# Patient Record
Sex: MALE | Race: WHITE | HISPANIC OR LATINO | Employment: PART TIME | URBAN - METROPOLITAN AREA
[De-identification: names, ages, dates, MRNs, and addresses within clinical notes are randomized per-mention and may not be internally consistent; named-entity substitution may affect disease eponyms.]

---

## 2020-11-03 ENCOUNTER — OFFICE VISIT (OUTPATIENT)
Dept: URGENT CARE | Facility: CLINIC | Age: 30
End: 2020-11-03
Payer: COMMERCIAL

## 2020-11-03 VITALS
HEIGHT: 67 IN | HEART RATE: 78 BPM | WEIGHT: 230 LBS | BODY MASS INDEX: 36.1 KG/M2 | RESPIRATION RATE: 18 BRPM | OXYGEN SATURATION: 98 % | TEMPERATURE: 98.6 F

## 2020-11-03 DIAGNOSIS — R05.9 COUGH: ICD-10-CM

## 2020-11-03 DIAGNOSIS — B34.9 VIRAL SYNDROME: ICD-10-CM

## 2020-11-03 DIAGNOSIS — R05.9 COUGH: Primary | ICD-10-CM

## 2020-11-03 PROCEDURE — 99213 OFFICE O/P EST LOW 20 MIN: CPT | Performed by: NURSE PRACTITIONER

## 2020-11-03 PROCEDURE — U0003 INFECTIOUS AGENT DETECTION BY NUCLEIC ACID (DNA OR RNA); SEVERE ACUTE RESPIRATORY SYNDROME CORONAVIRUS 2 (SARS-COV-2) (CORONAVIRUS DISEASE [COVID-19]), AMPLIFIED PROBE TECHNIQUE, MAKING USE OF HIGH THROUGHPUT TECHNOLOGIES AS DESCRIBED BY CMS-2020-01-R: HCPCS | Performed by: NURSE PRACTITIONER

## 2020-11-05 LAB — SARS-COV-2 RNA SPEC QL NAA+PROBE: NOT DETECTED

## 2020-11-08 ENCOUNTER — DOCUMENTATION (OUTPATIENT)
Dept: URGENT CARE | Facility: CLINIC | Age: 30
End: 2020-11-08

## 2020-11-10 ENCOUNTER — OFFICE VISIT (OUTPATIENT)
Dept: FAMILY MEDICINE CLINIC | Facility: CLINIC | Age: 30
End: 2020-11-10
Payer: COMMERCIAL

## 2020-11-10 VITALS
HEART RATE: 58 BPM | BODY MASS INDEX: 37.26 KG/M2 | TEMPERATURE: 98 F | WEIGHT: 237.4 LBS | RESPIRATION RATE: 18 BRPM | OXYGEN SATURATION: 98 % | HEIGHT: 67 IN | DIASTOLIC BLOOD PRESSURE: 74 MMHG | SYSTOLIC BLOOD PRESSURE: 124 MMHG

## 2020-11-10 DIAGNOSIS — Z13.220 SCREENING, LIPID: ICD-10-CM

## 2020-11-10 DIAGNOSIS — Z13.1 SCREENING FOR DIABETES MELLITUS: ICD-10-CM

## 2020-11-10 DIAGNOSIS — Z11.4 SCREENING FOR HIV (HUMAN IMMUNODEFICIENCY VIRUS): ICD-10-CM

## 2020-11-10 DIAGNOSIS — Z00.00 HEALTHCARE MAINTENANCE: Primary | ICD-10-CM

## 2020-11-10 PROCEDURE — 3008F BODY MASS INDEX DOCD: CPT | Performed by: FAMILY MEDICINE

## 2020-11-10 PROCEDURE — 99385 PREV VISIT NEW AGE 18-39: CPT | Performed by: FAMILY MEDICINE

## 2020-11-10 PROCEDURE — 3725F SCREEN DEPRESSION PERFORMED: CPT | Performed by: FAMILY MEDICINE

## 2020-11-10 PROCEDURE — 1036F TOBACCO NON-USER: CPT | Performed by: FAMILY MEDICINE

## 2020-12-18 ENCOUNTER — HOSPITAL ENCOUNTER (EMERGENCY)
Facility: HOSPITAL | Age: 30
Discharge: HOME | End: 2020-12-18
Attending: EMERGENCY MEDICINE
Payer: COMMERCIAL

## 2020-12-18 VITALS
BODY MASS INDEX: 35.31 KG/M2 | DIASTOLIC BLOOD PRESSURE: 76 MMHG | SYSTOLIC BLOOD PRESSURE: 136 MMHG | HEIGHT: 67 IN | OXYGEN SATURATION: 96 % | RESPIRATION RATE: 18 BRPM | TEMPERATURE: 98 F | WEIGHT: 225 LBS | HEART RATE: 73 BPM

## 2020-12-18 DIAGNOSIS — M54.50 ACUTE LOW BACK PAIN: Primary | ICD-10-CM

## 2020-12-18 PROCEDURE — 96372 THER/PROPH/DIAG INJ SC/IM: CPT

## 2020-12-18 PROCEDURE — 99284 EMERGENCY DEPT VISIT MOD MDM: CPT | Performed by: EMERGENCY MEDICINE

## 2020-12-18 PROCEDURE — 99283 EMERGENCY DEPT VISIT LOW MDM: CPT

## 2020-12-18 RX ORDER — LIDOCAINE 50 MG/G
1 PATCH TOPICAL ONCE
Status: DISCONTINUED | OUTPATIENT
Start: 2020-12-18 | End: 2020-12-19 | Stop reason: HOSPADM

## 2020-12-18 RX ORDER — METHOCARBAMOL 750 MG/1
750 TABLET, FILM COATED ORAL EVERY 6 HOURS PRN
Qty: 20 TABLET | Refills: 0 | Status: SHIPPED | OUTPATIENT
Start: 2020-12-18 | End: 2021-04-05

## 2020-12-18 RX ORDER — KETOROLAC TROMETHAMINE 30 MG/ML
30 INJECTION, SOLUTION INTRAMUSCULAR; INTRAVENOUS ONCE
Status: COMPLETED | OUTPATIENT
Start: 2020-12-18 | End: 2020-12-18

## 2020-12-18 RX ORDER — METHOCARBAMOL 500 MG/1
1500 TABLET, FILM COATED ORAL ONCE
Status: COMPLETED | OUTPATIENT
Start: 2020-12-18 | End: 2020-12-18

## 2020-12-18 RX ORDER — NAPROXEN 500 MG/1
500 TABLET ORAL 2 TIMES DAILY WITH MEALS
Qty: 20 TABLET | Refills: 0 | Status: SHIPPED | OUTPATIENT
Start: 2020-12-18 | End: 2021-04-05

## 2020-12-18 RX ADMIN — LIDOCAINE 5% 1 PATCH: 700 PATCH TOPICAL at 21:58

## 2020-12-18 RX ADMIN — KETOROLAC TROMETHAMINE 30 MG: 30 INJECTION, SOLUTION INTRAMUSCULAR at 21:57

## 2020-12-18 RX ADMIN — METHOCARBAMOL TABLETS 1500 MG: 500 TABLET, COATED ORAL at 21:58

## 2021-03-25 ENCOUNTER — OFFICE VISIT (OUTPATIENT)
Dept: URGENT CARE | Facility: CLINIC | Age: 31
End: 2021-03-25
Payer: COMMERCIAL

## 2021-03-25 VITALS
DIASTOLIC BLOOD PRESSURE: 80 MMHG | RESPIRATION RATE: 16 BRPM | BODY MASS INDEX: 35.29 KG/M2 | WEIGHT: 222 LBS | OXYGEN SATURATION: 99 % | SYSTOLIC BLOOD PRESSURE: 110 MMHG | HEART RATE: 53 BPM | TEMPERATURE: 98.3 F

## 2021-03-25 DIAGNOSIS — H57.89 IRRITATION OF RIGHT EYE: Primary | ICD-10-CM

## 2021-03-25 PROCEDURE — 99213 OFFICE O/P EST LOW 20 MIN: CPT | Performed by: PHYSICIAN ASSISTANT

## 2021-03-25 RX ORDER — IBUPROFEN 600 MG/1
TABLET ORAL
COMMUNITY
Start: 2021-02-23 | End: 2021-04-05

## 2021-03-25 NOTE — PROGRESS NOTES
[facility]        NAME: Hung Quezada is a 35 y o  male  : 1990    MRN: [id number]  DATE: 2021  TIME: 8:52 AM    Assessment and Plan   Irritation of right eye [H57 89]  1  Irritation of right eye           Patient Instructions   Patient Instructions   Use your lubricating eye drops today   Follow with your eye doctor if not improving   Watch out for a rash forming by your eye  If this happens be seen again         Follow up with PCP in 3-5 days  Proceed to  ER if symptoms worsen  Chief Complaint     Chief Complaint   Patient presents with    Eye Pain     R eye burning and eye lid swelling since 5 am         History of Present Illness       The pt is a 81-year-old male presenting with burning of his right eye and lid swelling since 5 am this morning  Denies blurry vision, pain with eye movements, fever or chills  He did put lotion on last night and is not sure if he got it into his eye during the night  He does admit to some watery drainage  The pain and swelling has gone down  Review of Systems   Review of Systems   Constitutional: Negative for activity change, appetite change and fever  Eyes: Positive for pain and redness (Mild )  Negative for discharge (Watery drainage ) and visual disturbance  Contact wearer          Current Medications       Current Outpatient Medications:     ibuprofen (MOTRIN) 600 mg tablet, take 1 tablet by mouth four times a day before meals and at bedti   (REFER TO PRESCRIPTION NOTES)  , Disp: , Rfl:     methocarbamol (ROBAXIN) 750 mg tablet, Take 1 tablet (750 mg total) by mouth every 6 (six) hours as needed for muscle spasms (Patient not taking: Reported on 3/25/2021), Disp: 20 tablet, Rfl: 0    naproxen (NAPROSYN) 500 mg tablet, Take 1 tablet (500 mg total) by mouth 2 (two) times a day with meals (Patient not taking: Reported on 3/25/2021), Disp: 20 tablet, Rfl: 0    Current Allergies     Allergies as of 2021 - Reviewed 03/25/2021   Allergen Reaction Noted    Penicillins  11/03/2020            The following portions of the patient's history were reviewed and updated as appropriate: allergies, current medications, past family history, past medical history, past social history, past surgical history and problem list      Past Medical History:   Diagnosis Date    No pertinent past medical history        Past Surgical History:   Procedure Laterality Date    NO PAST SURGERIES         Family History   Problem Relation Age of Onset    No Known Problems Mother     Hypertension Father     Diabetes Maternal Grandmother     Diabetes Paternal Grandmother          Medications have been verified  Objective   /80   Pulse (!) 53   Temp 98 3 °F (36 8 °C) (Temporal)   Resp 16   Wt 101 kg (222 lb)   SpO2 99%   BMI 35 29 kg/m²          Physical Exam     Physical Exam  Vitals signs reviewed  Constitutional:       General: He is not in acute distress  Appearance: Normal appearance  He is normal weight  He is not ill-appearing, toxic-appearing or diaphoretic  HENT:      Head: Normocephalic and atraumatic  Eyes:      General: No visual field deficit  Right eye: No discharge or hordeolum  Left eye: No discharge or hordeolum  Extraocular Movements: Extraocular movements intact  Right eye: Normal extraocular motion  Left eye: Normal extraocular motion  Conjunctiva/sclera:      Right eye: Right conjunctiva is injected  No chemosis, exudate or hemorrhage  Left eye: Left conjunctiva is not injected  No chemosis, exudate or hemorrhage  Neck:      Musculoskeletal: Normal range of motion  Cardiovascular:      Rate and Rhythm: Normal rate and regular rhythm  Heart sounds: Normal heart sounds  No murmur  No friction rub  No gallop  Pulmonary:      Effort: Pulmonary effort is normal  No respiratory distress  Breath sounds: Normal breath sounds  No stridor   No wheezing, rhonchi or rales  Chest:      Chest wall: No tenderness  Lymphadenopathy:      Cervical: No cervical adenopathy  Skin:     General: Skin is warm and dry  Capillary Refill: Capillary refill takes less than 2 seconds  Neurological:      Mental Status: He is alert  Universal Protocol:  Consent: Verbal consent obtained  Consent given by: patient  Patient understanding: patient states understanding of the procedure being performed  Patient identity confirmed: verbally with patient    Foreign body removal    Date/Time: 3/25/2021 8:51 AM  Performed by: Lazarus Hoot, PA-C  Authorized by: Lazarus Hoot, PA-C   Body area: eye  Location details: right conjunctiva    Anesthesia:  Local Anesthetic: tetracaine drops  Anesthetic total (drops): 1  Sedation:  Patient sedated: no  Comments: Eye stained and visualized with wood lamp   No uptake seen

## 2021-03-25 NOTE — PATIENT INSTRUCTIONS
Use your lubricating eye drops today   Follow with your eye doctor if not improving   Watch out for a rash forming by your eye   If this happens be seen again

## 2021-04-04 ENCOUNTER — HOSPITAL ENCOUNTER (EMERGENCY)
Facility: HOSPITAL | Age: 31
Discharge: HOME | End: 2021-04-04
Attending: EMERGENCY MEDICINE
Payer: COMMERCIAL

## 2021-04-04 VITALS
OXYGEN SATURATION: 96 % | SYSTOLIC BLOOD PRESSURE: 125 MMHG | DIASTOLIC BLOOD PRESSURE: 73 MMHG | HEART RATE: 94 BPM | TEMPERATURE: 100.8 F | RESPIRATION RATE: 18 BRPM

## 2021-04-04 DIAGNOSIS — Z20.822 SUSPECTED COVID-19 VIRUS INFECTION: Primary | ICD-10-CM

## 2021-04-04 PROCEDURE — 99283 EMERGENCY DEPT VISIT LOW MDM: CPT

## 2021-04-04 PROCEDURE — 87635 SARS-COV-2 COVID-19 AMP PRB: CPT | Performed by: NURSE PRACTITIONER

## 2021-04-04 PROCEDURE — 99284 EMERGENCY DEPT VISIT MOD MDM: CPT | Performed by: NURSE PRACTITIONER

## 2021-04-04 NOTE — ED PROVIDER NOTES
History  Chief Complaint   Patient presents with    Flu Symptoms     Patient co fever and chills that started this morning  Patient reports sister recently tested + for covid  This is a 25-year-old male patient presents here with his girlfriend with complaints concerns for COVID infection  He reports he fell ill with symptoms yesterday  Reports his sister who was [place] tested positive a few days ago  He is endorsing fever today  Body aches chills  Flu Symptoms  Presenting symptoms: fever    Presenting symptoms: no cough, no shortness of breath, no sore throat and no vomiting    Associated symptoms: chills    Associated symptoms: no ear pain        Prior to Admission Medications   Prescriptions Last Dose Informant Patient Reported? Taking?   ibuprofen (MOTRIN) 600 mg tablet   Yes No   Sig: take 1 tablet by mouth four times a day before meals and at bedti   (REFER TO PRESCRIPTION NOTES)  methocarbamol (ROBAXIN) 750 mg tablet   No No   Sig: Take 1 tablet (750 mg total) by mouth every 6 (six) hours as needed for muscle spasms   Patient not taking: Reported on 3/25/2021   naproxen (NAPROSYN) 500 mg tablet   No No   Sig: Take 1 tablet (500 mg total) by mouth 2 (two) times a day with meals   Patient not taking: Reported on 3/25/2021      Facility-Administered Medications: None       Past Medical History:   Diagnosis Date    No pertinent past medical history        Past Surgical History:   Procedure Laterality Date    NO PAST SURGERIES         Family History   Problem Relation Age of Onset    No Known Problems Mother     Hypertension Father     Diabetes Maternal Grandmother     Diabetes Paternal Grandmother      I have reviewed and agree with the history as documented      E-Cigarette/Vaping    E-Cigarette Use Never User      E-Cigarette/Vaping Substances     Social History     Tobacco Use    Smoking status: Former Smoker    Smokeless tobacco: Never Used    Tobacco comment: Quit 11/2018; 1/2 ppd for 10 years    Substance Use Topics    Alcohol use: Yes     Frequency: 2-3 times a week    Drug use: Never       Review of Systems   Constitutional: Positive for chills and fever  HENT: Negative for ear pain and sore throat  Eyes: Negative for pain and visual disturbance  Respiratory: Negative for cough and shortness of breath  Cardiovascular: Negative for chest pain and palpitations  Gastrointestinal: Negative for abdominal pain and vomiting  Genitourinary: Negative for dysuria and hematuria  Musculoskeletal: Positive for arthralgias  Negative for back pain  Skin: Negative for color change and rash  Neurological: Negative for seizures and syncope  All other systems reviewed and are negative  Physical Exam  Physical Exam  Vitals signs and nursing note reviewed  Constitutional:       General: He is not in acute distress  Appearance: He is well-developed  HENT:      Head: Normocephalic and atraumatic  Eyes:      General:         Right eye: No discharge  Left eye: No discharge  Conjunctiva/sclera: Conjunctivae normal    Neck:      Musculoskeletal: Normal range of motion and neck supple  Cardiovascular:      Rate and Rhythm: Normal rate  Pulmonary:      Effort: Pulmonary effort is normal  No respiratory distress  Abdominal:      General: There is no distension  Tenderness: There is no guarding  Musculoskeletal:         General: No deformity  Skin:     General: Skin is warm and dry  Neurological:      Mental Status: He is alert and oriented to person, place, and time        Coordination: Coordination normal          Vital Signs  ED Triage Vitals [04/04/21 1816]   Temperature Pulse Respirations Blood Pressure SpO2   (!) 100 8 °F (38 2 °C) 94 18 125/73 96 %      Temp Source Heart Rate Source Patient Position - Orthostatic VS BP Location FiO2 (%)   Oral Monitor Sitting Left arm --      Pain Score       --           Vitals:    04/04/21 1816 BP: 125/73   Pulse: 94   Patient Position - Orthostatic VS: Sitting         Visual Acuity      ED Medications  Medications - No data to display    Diagnostic Studies  Results Reviewed     Procedure Component Value Units Date/Time    [id number] Collected: 04/04/21 1909    Lab Status: In process Specimen: Nares from Nasopharyngeal Swab Updated: 04/04/21 1911                 No orders to display              Procedures  Procedures         ED Course                                           MDM  Number of Diagnoses or Management Options  Suspected COVID-19 virus infection: new and requires workup  Diagnosis management comments: Patient notified of the 12-24 hour delay for test results  Instructed him to check his MyChart apt to get results in real time  He was agreeable to this  Amount and/or Complexity of Data Reviewed  Clinical lab tests: ordered  Independent visualization of images, tracings, or specimens: yes    Patient Progress  Patient progress: stable      Disposition  Final diagnoses:   Suspected COVID-19 virus infection     Time reflects when diagnosis was documented in both MDM as applicable and the Disposition within this note     Time User Action Codes Description Comment    4/4/2021  6:41 PM Pam Henry Add [Z20 822] Suspected COVID-19 virus infection       ED Disposition     ED Disposition Condition Date/Time Comment    Discharge Stable Sun Apr 4, 2021  6:41 PM Jeff Edwards discharge to home/self care              Follow-up Information     Follow up With Specialties Details Why Contact Olamide Talamantes, DO Family Medicine  As needed   [address]  [facility]  [phone]            Discharge Medication List as of 4/4/2021  6:41 PM      CONTINUE these medications which have NOT CHANGED    Details   ibuprofen (MOTRIN) 600 mg tablet take 1 tablet by mouth four times a day before meals and at bedti   (REFER TO PRESCRIPTION NOTES)  , Historical Med      methocarbamol (ROBAXIN) 750 mg tablet Take 1 tablet (750 mg total) by mouth every 6 (six) hours as needed for muscle spasms, Starting Fri 12/18/2020, Print      naproxen (NAPROSYN) 500 mg tablet Take 1 tablet (500 mg total) by mouth 2 (two) times a day with meals, Starting Fri 12/18/2020, Print           No discharge procedures on file      PDMP Review     None          ED Provider  Electronically Signed by           LAVERNE Agustin  04/04/21 3088

## 2021-04-04 NOTE — Clinical Note
Jessenia Whittington was seen and treated in our emergency department on 4/4/2021  Diagnosis:     Slick Ocasio  may return to work on return date  He may return on this date: 04/13/2021         If you have any questions or concerns, please don't hesitate to call        Shagufta Davis RN    ______________________________           _______________          _______________  Hospital Representative                              Date                                Time

## 2021-04-05 ENCOUNTER — TELEMEDICINE (OUTPATIENT)
Dept: FAMILY MEDICINE CLINIC | Facility: CLINIC | Age: 31
End: 2021-04-05
Payer: COMMERCIAL

## 2021-04-05 DIAGNOSIS — U07.1 COVID-19: Primary | ICD-10-CM

## 2021-04-05 LAB — SARS-COV-2 RNA RESP QL NAA+PROBE: POSITIVE

## 2021-04-05 PROCEDURE — 99214 OFFICE O/P EST MOD 30 MIN: CPT | Performed by: FAMILY MEDICINE

## 2021-04-05 RX ORDER — BENZONATATE 100 MG/1
100 CAPSULE ORAL 3 TIMES DAILY PRN
Qty: 20 CAPSULE | Refills: 0 | Status: SHIPPED | OUTPATIENT
Start: 2021-04-05 | End: 2021-11-11 | Stop reason: ALTCHOICE

## 2021-04-05 NOTE — PROGRESS NOTES
COVID-19 Outpatient Progress Note    Assessment/Plan:    Problem List Items Addressed This Visit        Other    COVID-19 - Primary    Relevant Medications    benzonatate (TESSALON PERLES) 100 mg capsule         Disposition:     I recommended continued isolation until at least 24 hours have passed since recovery defined as resolution of fever without the use of fever-reducing medications AND improvement in COVID symptoms AND 10 days have passed since onset of symptoms (or 10 days have passed since date of first positive viral diagnostic test for asymptomatic patients)  Encouraged supportive care and to call if symptoms worsen  Reviewed mAb -- pt would like to think about  I have spent 8 minutes directly with the patient  Encounter provider Sasha Almaguer DO    Provider located at [place] [address]  [address] [place] [phone]    Recent Visits  No visits were found meeting these conditions  Showing recent visits within past 7 days and meeting all other requirements     Today's Visits  Date Type Provider Dept   04/05/21 Telemedicine Lani Boyd DO [facility] [address] today's visits and meeting all other requirements     Future Appointments  No visits were found meeting these conditions  Showing future appointments within next 150 days and meeting all other requirements        Patient agrees to participate in a virtual check in via telephone or video visit instead of presenting to the office to address urgent/immediate medical needs  Patient is aware this is a billable service  After connecting through Telephone, the patient was identified by name and date of birth  Veronica Barclay was informed that this was a telemedicine visit and that the exam was being conducted confidentially over secure lines  My office door was closed  No one else was in the room   Veronica Barclay acknowledged consent and understanding of privacy and security of the telemedicine visit  I informed the patient that I have reviewed his record in Epic and presented the opportunity for him to ask any questions regarding the visit today  The patient agreed to participate  It was my intent to perform this visit via video technology but the patient was not able to do a video connection so the visit was completed via audio telephone only  Subjective:   Isael Saunders is a 35 y o  male who has been screened for COVID-19  Symptom change since last report: worsening  Patient's symptoms include fever (woke up drenched in sweat this morning), chills, fatigue, nasal congestion, cough, shortness of breath and myalgias  Patient denies malaise, rhinorrhea, sore throat, anosmia, loss of taste, chest tightness, abdominal pain, nausea, vomiting, diarrhea and headaches (resolved when the fever stopped)  Lencho Awan has been staying home and has isolated themselves in his home  He is taking care to not share personal items and is cleaning all surfaces that are touched often, like counters, tabletops, and doorknobs using household cleaning sprays or wipes  He is wearing a mask when he leaves his room  Date of symptom onset: 4/3/2021  Date of positive COVID-19 PCR: 4/4/2021    Multiple household contacts are (+)     Lab Results   Component Value Date    SARSCOV2 Positive (A) 04/04/2021    Aliyah Esteban Not Detected 11/03/2020     Past Medical History:   Diagnosis Date    No pertinent past medical history      Past Surgical History:   Procedure Laterality Date    NO PAST SURGERIES       Current Outpatient Medications   Medication Sig Dispense Refill    benzonatate (TESSALON PERLES) 100 mg capsule Take 1 capsule (100 mg total) by mouth 3 (three) times a day as needed for cough 20 capsule 0     No current facility-administered medications for this visit        Allergies   Allergen Reactions    Penicillins        Review of Systems   Constitutional: Positive for chills, fatigue and fever (woke up drenched in sweat this morning)  HENT: Positive for congestion  Negative for rhinorrhea and sore throat  Respiratory: Positive for cough and shortness of breath  Negative for chest tightness  Gastrointestinal: Negative for abdominal pain, diarrhea, nausea and vomiting  Musculoskeletal: Positive for myalgias  Neurological: Negative for headaches (resolved when the fever stopped)  Objective: There were no vitals filed for this visit  Physical Exam   No PE due to telephone only exam  No respiratory distress during conversation  VIRTUAL VISIT DISCLAIMER    Josy Ferrell acknowledges that he has consented to an online visit or consultation  He understands that the online visit is based solely on information provided by him, and that, in the absence of a face-to-face physical evaluation by the physician, the diagnosis he receives is both limited and provisional in terms of accuracy and completeness  This is not intended to replace a full medical face-to-face evaluation by the physician  Josy Ferrell understands and accepts these terms

## 2021-04-06 ENCOUNTER — TELEMEDICINE (OUTPATIENT)
Dept: FAMILY MEDICINE CLINIC | Facility: CLINIC | Age: 31
End: 2021-04-06
Payer: COMMERCIAL

## 2021-04-06 ENCOUNTER — TELEPHONE (OUTPATIENT)
Dept: FAMILY MEDICINE CLINIC | Facility: CLINIC | Age: 31
End: 2021-04-06

## 2021-04-06 DIAGNOSIS — U07.1 COVID-19: Primary | ICD-10-CM

## 2021-04-06 PROCEDURE — 99212 OFFICE O/P EST SF 10 MIN: CPT | Performed by: FAMILY MEDICINE

## 2021-04-06 NOTE — LETTER
April 6, 2021    Patient: Shasta Moreira  YOB: 1990  Date of Last Encounter: 4/6/2021      To whom it may concern:     Shasta Moreira has tested positive for COVID-19 (Coronavirus)  He may return to work on 04/13/2021, which is 10 days from illness onset (provided symptoms are improving) and 24 hours without fever      Sincerely,         [employer], DO

## 2021-04-06 NOTE — TELEPHONE ENCOUNTER
Pt needs a note stating he has covid, needs to quarantee for whatever period of time and at this point can return to work on  [employer] day you say     Call pt when ready

## 2021-04-06 NOTE — PROGRESS NOTES
COVID-19 Outpatient Progress Note    Assessment/Plan:    Problem List Items Addressed This Visit        Other    COVID-19 - Primary         Disposition:     I recommended continued isolation until at least 24 hours have passed since recovery defined as resolution of fever without the use of fever-reducing medications AND improvement in COVID symptoms AND 10 days have passed since onset of symptoms (or 10 days have passed since date of first positive viral diagnostic test for asymptomatic patients)  I have spent 5 minutes directly with the patient  Encounter provider Lopez Johnston DO    Provider located at [address]  [address] [place] [phone]    Recent Visits  Date Type Provider Dept   04/05/21 Telemedicine Lani Boyd DO [facility] [address] recent visits within past 7 days and meeting all other requirements     Today's Visits  Date Type Provider Dept   04/06/21 Telemedicine Lani Boyd DO [facility]   Showing today's visits and meeting all other requirements     Future Appointments  No visits were found meeting these conditions  Showing future appointments within next 150 days and meeting all other requirements      This virtual check-in was done via EPIS and patient was informed that this is not a secure, HIPAA-compliant platform  He agrees to proceed  Patient agrees to participate in a virtual check in via telephone or video visit instead of presenting to the office to address urgent/immediate medical needs  Patient is aware this is a billable service  After connecting through [facility], the patient was identified by name and date of birth  Jannet Mata was informed that this was a telemedicine visit and that the exam was being conducted confidentially over secure lines  My office door was closed  No one else was in the room   Jannet Mata acknowledged consent and understanding of privacy and security of the telemedicine visit  I informed the patient that I have reviewed his record in Epic and presented the opportunity for him to ask any questions regarding the visit today  The patient agreed to participate  Subjective:   Isael Saunders is a 35 y o  male who has been screened for COVID-19  Symptom change since last report: improving  Patient's symptoms include nasal congestion, shortness of breath (with exertion if going too quickly) and myalgias (though joint pain has improved)  Patient denies fever, chills, fatigue, malaise, rhinorrhea, sore throat, anosmia, loss of taste, cough (not as persistent, has the sensation to clear his throat), chest tightness, abdominal pain, nausea, vomiting, diarrhea and headaches  Lencho Awan has been staying home and has isolated themselves in his home  He is taking care to not share personal items and is cleaning all surfaces that are touched often, like counters, tabletops, and doorknobs using household cleaning sprays or wipes  He is wearing a mask when he leaves his room  Date of symptom onset: 4/3/2021  Date of positive COVID-19 PCR: 4/4/2021    Fevers improved  Congestion is a bit worse  Taking Dayquil, chlorseptic drops     Lab Results   Component Value Date    SARSCOV2 Positive (A) 04/04/2021    SARSCOV2 Not Detected 11/03/2020     Past Medical History:   Diagnosis Date    No pertinent past medical history      Past Surgical History:   Procedure Laterality Date    NO PAST SURGERIES       Current Outpatient Medications   Medication Sig Dispense Refill    benzonatate (TESSALON PERLES) 100 mg capsule Take 1 capsule (100 mg total) by mouth 3 (three) times a day as needed for cough 20 capsule 0     No current facility-administered medications for this visit  Allergies   Allergen Reactions    Penicillins        Review of Systems   Constitutional: Negative for chills, fatigue and fever  HENT: Positive for congestion   Negative for rhinorrhea and sore throat  Respiratory: Positive for shortness of breath (with exertion if going too quickly)  Negative for cough (not as persistent, has the sensation to clear his throat) and chest tightness  Gastrointestinal: Negative for abdominal pain, diarrhea, nausea and vomiting  Musculoskeletal: Positive for myalgias (though joint pain has improved)  Neurological: Negative for headaches  Objective: There were no vitals filed for this visit  Physical Exam  Vitals signs and nursing note reviewed  Constitutional:       General: He is not in acute distress  Appearance: Normal appearance  HENT:      Head: Normocephalic and atraumatic  Pulmonary:      Effort: Pulmonary effort is normal  No respiratory distress  Neurological:      General: No focal deficit present  Mental Status: He is alert  Psychiatric:         Mood and Affect: Mood normal        VIRTUAL VISIT DISCLAIMER    Catia Bacon acknowledges that he has consented to an online visit or consultation  He understands that the online visit is based solely on information provided by him, and that, in the absence of a face-to-face physical evaluation by the physician, the diagnosis he receives is both limited and provisional in terms of accuracy and completeness  This is not intended to replace a full medical face-to-face evaluation by the physician  Catia Bacon understands and accepts these terms

## 2021-04-12 ENCOUNTER — TELEMEDICINE (OUTPATIENT)
Dept: FAMILY MEDICINE CLINIC | Facility: CLINIC | Age: 31
End: 2021-04-12
Payer: COMMERCIAL

## 2021-04-12 DIAGNOSIS — U07.1 COVID-19: Primary | ICD-10-CM

## 2021-04-12 PROCEDURE — 99214 OFFICE O/P EST MOD 30 MIN: CPT | Performed by: FAMILY MEDICINE

## 2021-04-12 RX ORDER — ALBUTEROL SULFATE 90 UG/1
2 AEROSOL, METERED RESPIRATORY (INHALATION) EVERY 6 HOURS PRN
Qty: 18 G | Refills: 0 | Status: SHIPPED | OUTPATIENT
Start: 2021-04-12 | End: 2022-04-27

## 2021-04-12 NOTE — LETTER
April 12, 2021    Patient: Noy Ellington  YOB: 1990  Date of Last Encounter: 4/7/2021      To whom it may concern:     Noy Ellington has tested positive for COVID-19 (Coronavirus)  Pt currently does not meet criteria to lift isolation as previously planned due to persistent symptoms  He needs to continue home isolation until further notice       Sincerely,         Carlos Arevalo, DO

## 2021-04-12 NOTE — PROGRESS NOTES
COVID-19 Outpatient Progress Note    Assessment/Plan:    Problem List Items Addressed This Visit        Other    COVID-19 - Primary         Disposition:     I recommended continued isolation until at least 24 hours have passed since recovery defined as resolution of fever without the use of fever-reducing medications AND improvement in COVID symptoms AND 10 days have passed since onset of symptoms (or 10 days have passed since date of first positive viral diagnostic test for asymptomatic patients)  Pt's respiratory symptoms have not improved, so he cannot be released from isolation as of yet  Will trial ABDULLAHI PRN for shortness of breath with exertion  Can trial Mucinex (or similar) as well, which pt states he has at home  I have spent 5 minutes directly with the patient  Encounter provider Henrry Snow DO    Provider located at [place] [address]  [address] [place] [phone]    Recent Visits  Date Type Provider Dept   04/06/21 Telephone Yomi Becerril   04/06/21 Telemedicine DO Onesimo Rodríguez   04/05/21 Telemedicine DO Onesimo Rodríguez   Showing recent visits within past 7 days and meeting all other requirements     Today's Visits  Date Type Provider Dept   04/12/21 Telemedicine DO Onesimo Rodríguez   Showing today's visits and meeting all other requirements     Future Appointments  No visits were found meeting these conditions  Showing future appointments within next 150 days and meeting all other requirements      This virtual check-in was done via [employer] and patient was informed that this is not a secure, HIPAA-compliant platform  He agrees to proceed  Patient agrees to participate in a virtual check in via telephone or video visit instead of presenting to the office to address urgent/immediate medical needs   Patient is aware this is a billable service  After connecting through [facility], the patient was identified by name and date of birth  Nolen Duverney was informed that this was a telemedicine visit and that the exam was being conducted confidentially over secure lines  My office door was closed  No one else was in the room  Nolen Duverney acknowledged consent and understanding of privacy and security of the telemedicine visit  I informed the patient that I have reviewed his record in Epic and presented the opportunity for him to ask any questions regarding the visit today  The patient agreed to participate  Subjective:   Nolen Duverney is a 35 y o  male who has been screened for COVID-19  Symptom change since last report: improving  Patient's symptoms include cough and shortness of breath (with exertion)  Patient denies fever, chills, fatigue, malaise, congestion, rhinorrhea, sore throat, anosmia, loss of taste, chest tightness, abdominal pain, nausea, vomiting, diarrhea, myalgias and headaches  Maurilio Dimas has been staying home and has isolated themselves in his home  He is taking care to not share personal items and is cleaning all surfaces that are touched often, like counters, tabletops, and doorknobs using household cleaning sprays or wipes  He is wearing a mask when he leaves his room       Date of symptom onset: 4/3/2021  Date of positive COVID-19 PCR: 4/4/2021    Better all the way around except for his chest congestion and cough, which have not improved -- worst when he tries to take deep breaths     Lab Results   Component Value Date    SARSCOV2 Positive (A) 04/04/2021    Jose Linder Not Detected 11/03/2020     Past Medical History:   Diagnosis Date    No pertinent past medical history      Past Surgical History:   Procedure Laterality Date    NO PAST SURGERIES       Current Outpatient Medications   Medication Sig Dispense Refill    benzonatate (TESSALON PERLES) 100 mg capsule Take 1 capsule (100 mg total) by mouth 3 (three) times a day as needed for cough 20 capsule 0     No current facility-administered medications for this visit  Allergies   Allergen Reactions    Penicillins        Review of Systems   Constitutional: Negative for chills, fatigue and fever  HENT: Negative for congestion, rhinorrhea and sore throat  Respiratory: Positive for cough and shortness of breath (with exertion)  Negative for chest tightness  Gastrointestinal: Negative for abdominal pain, diarrhea, nausea and vomiting  Musculoskeletal: Negative for myalgias  Neurological: Negative for headaches  Objective: There were no vitals filed for this visit  Physical Exam  Vitals signs and nursing note reviewed  Constitutional:       General: He is not in acute distress  Appearance: Normal appearance  HENT:      Head: Normocephalic and atraumatic  Pulmonary:      Effort: Pulmonary effort is normal  No respiratory distress  Neurological:      General: No focal deficit present  Mental Status: He is alert  Psychiatric:         Mood and Affect: Mood normal        VIRTUAL VISIT DISCLAIMER    Arzella Fabry acknowledges that he has consented to an online visit or consultation  He understands that the online visit is based solely on information provided by him, and that, in the absence of a face-to-face physical evaluation by the physician, the diagnosis he receives is both limited and provisional in terms of accuracy and completeness  This is not intended to replace a full medical face-to-face evaluation by the physician  Arzella Fabry understands and accepts these terms

## 2021-04-14 ENCOUNTER — TELEMEDICINE (OUTPATIENT)
Dept: FAMILY MEDICINE CLINIC | Facility: CLINIC | Age: 31
End: 2021-04-14
Payer: COMMERCIAL

## 2021-04-14 VITALS — BODY MASS INDEX: 34.84 KG/M2 | WEIGHT: 222 LBS | HEIGHT: 67 IN | TEMPERATURE: 98.6 F

## 2021-04-14 DIAGNOSIS — U07.1 COVID-19: Primary | ICD-10-CM

## 2021-04-14 PROCEDURE — 99212 OFFICE O/P EST SF 10 MIN: CPT | Performed by: PHYSICIAN ASSISTANT

## 2021-04-14 NOTE — PROGRESS NOTES
COVID-19 Outpatient Progress Note    Assessment/Plan:    Problem List Items Addressed This Visit        Other    COVID-19 - Primary         Disposition:     I recommended continued isolation until at least 24 hours have passed since recovery defined as resolution of fever without the use of fever-reducing medications AND improvement in COVID symptoms AND 10 days have passed since onset of symptoms (or 10 days have passed since date of first positive viral diagnostic test for asymptomatic patients)  28-year-old male presents for a follow-up  Patient continues to have SOB that have improved with ventolin inhaler  He also is taking tesalon perles for cough with some improvement  Patient works for [employer], although he has completed his self isolation period he continues to rest and is out of work right now  Recommend continue mucinex and inhaler  If any worsening SOB, chest pain, pain on deep inspiration notify office or proceed to ER  Follow up in 2 days  I have spent 5 minutes directly with the patient  Greater than 50% of this time was spent in counseling/coordination of care regarding: instructions for management  Encounter provider Merlin Most, PA-C    Provider located at [address]  [address] [place] [phone]    Recent Visits  Date Type Provider Dept   04/12/21 Telemedicine Lani Boyd DO [facility] [address] recent visits within past 7 days and meeting all other requirements     Today's Visits  Date Type Provider Dept   04/14/21 Telemedicine Renea Zhao PA-C [facility] [address] today's visits and meeting all other requirements     Future Appointments  No visits were found meeting these conditions     Showing future appointments within next 150 days and meeting all other requirements        Patient agrees to participate in a virtual check in via telephone or video visit instead of presenting to the office to address urgent/immediate medical needs  Patient is aware this is a billable service  After connecting through Telephone, the patient was identified by name and date of birth  Yoli Evangelista was informed that this was a telemedicine visit and that the exam was being conducted confidentially over secure lines  My office door was closed  Yoli Evangelista acknowledged consent and understanding of privacy and security of the telemedicine visit  I informed the patient that I have reviewed his record in Epic and presented the opportunity for him to ask any questions regarding the visit today  The patient agreed to participate  It was my intent to perform this visit via video technology but the patient was not able to do a video connection so the visit was completed via audio telephone only  Subjective:   Yoli Evangelista is a 35 y o  male who has been screened for COVID-19  Symptom change since last report: improving  Patient's symptoms include shortness of breath  Marion Hum has been staying home and has isolated themselves in his home  He is taking care to not share personal items and is cleaning all surfaces that are touched often, like counters, tabletops, and doorknobs using household cleaning sprays or wipes  He is wearing a mask when he leaves his room       Lab Results   Component Value Date    SARSCOV2 Positive (A) 04/04/2021    Scott Beets Not Detected 11/03/2020     Past Medical History:   Diagnosis Date    No pertinent past medical history      Past Surgical History:   Procedure Laterality Date    NO PAST SURGERIES       Current Outpatient Medications   Medication Sig Dispense Refill    albuterol (PROVENTIL HFA,VENTOLIN HFA) 90 mcg/act inhaler Inhale 2 puffs every 6 (six) hours as needed for wheezing or shortness of breath 18 g 0    benzonatate (TESSALON PERLES) 100 mg capsule Take 1 capsule (100 mg total) by mouth 3 (three) times a day as needed for cough 20 capsule 0     No current facility-administered medications for this visit  Allergies   Allergen Reactions    Penicillins        Review of Systems   Respiratory: Positive for shortness of breath  All other systems reviewed and are negative  Objective:    Vitals:    04/14/21 1115   Temp: 98 6 °F (37 °C)   Weight: 101 kg (222 lb)   Height: 5' 6 5" (1 689 m)       Physical Exam  Constitutional:       General: He is not in acute distress  Neurological:      Mental Status: He is alert  speaking in full sentences    VIRTUAL VISIT DISCLAIMER    Yonathan Carreon acknowledges that he has consented to an online visit or consultation  He understands that the online visit is based solely on information provided by him, and that, in the absence of a face-to-face physical evaluation by the physician, the diagnosis he receives is both limited and provisional in terms of accuracy and completeness  This is not intended to replace a full medical face-to-face evaluation by the physician  Yonathan Carreon understands and accepts these terms

## 2021-04-19 ENCOUNTER — TELEMEDICINE (OUTPATIENT)
Dept: FAMILY MEDICINE CLINIC | Facility: CLINIC | Age: 31
End: 2021-04-19
Payer: COMMERCIAL

## 2021-04-19 VITALS — BODY MASS INDEX: 34.84 KG/M2 | HEIGHT: 67 IN | WEIGHT: 222 LBS

## 2021-04-19 DIAGNOSIS — U07.1 COVID-19: Primary | ICD-10-CM

## 2021-04-19 PROCEDURE — 99213 OFFICE O/P EST LOW 20 MIN: CPT | Performed by: PHYSICIAN ASSISTANT

## 2021-04-21 ENCOUNTER — TELEMEDICINE (OUTPATIENT)
Dept: FAMILY MEDICINE CLINIC | Facility: CLINIC | Age: 31
End: 2021-04-21
Payer: COMMERCIAL

## 2021-04-21 VITALS — WEIGHT: 222 LBS | BODY MASS INDEX: 34.84 KG/M2 | HEIGHT: 67 IN

## 2021-04-21 DIAGNOSIS — U07.1 COVID-19: Primary | ICD-10-CM

## 2021-04-21 PROCEDURE — 99213 OFFICE O/P EST LOW 20 MIN: CPT | Performed by: PHYSICIAN ASSISTANT

## 2021-04-21 PROCEDURE — 3008F BODY MASS INDEX DOCD: CPT | Performed by: PHYSICIAN ASSISTANT

## 2021-04-21 NOTE — PROGRESS NOTES
COVID-19 Outpatient Progress Note    Assessment/Plan:    Problem List Items Addressed This Visit        Other    COVID-19 - Primary         Disposition:     I recommended continued isolation until at least 24 hours have passed since recovery defined as resolution of fever without the use of fever-reducing medications AND improvement in COVID symptoms AND 10 days have passed since onset of symptoms (or 10 days have passed since date of first positive viral diagnostic test for asymptomatic patients)  69-year-old male presents for COVID follow-up  Patient has had some improvement of his symptoms however he does know a lingering shortness of breath on exertion  He states he was outside doing some yd work and did note some shortness of breath that he had to go back in the house as take a break  He did not use his inhaler today however  Does not feel ready to go back to work quite yet  Denies fever  I have spent 5 minutes directly with the patient  Greater than 50% of this time was spent in counseling/coordination of care regarding: instructions for management  Encounter provider Neyda Chawla PA-C    Provider located at [address]  [address] [place] [phone]    Recent Visits  Date Type Provider Dept   04/19/21 Telemedicine MEAGHAN Mckeon [facility]   04/14/21 Telemedicine MEAGHAN Britt [facility]   Showing recent visits within past 7 days and meeting all other requirements     Today's Visits  Date Type Provider Dept   04/21/21 Telemedicine MEAGHAN Mckeon [facility]   Showing today's visits and meeting all other requirements     Future Appointments  No visits were found meeting these conditions     Showing future appointments within next 150 days and meeting all other requirements          Subjective:   Emiliano Peña is a 35 y o  male who has been screened for COVID-19  Patient's symptoms include fatigue and shortness of breath  Patient denies fever, chills, congestion, sore throat, cough, chest tightness, abdominal pain, nausea, vomiting, diarrhea, myalgias and headaches  Lab Results   Component Value Date    SARSCOV2 Positive (A) 04/04/2021    SARSCOV2 Not Detected 11/03/2020     Past Medical History:   Diagnosis Date    No pertinent past medical history      Past Surgical History:   Procedure Laterality Date    NO PAST SURGERIES       Current Outpatient Medications   Medication Sig Dispense Refill    albuterol (PROVENTIL HFA,VENTOLIN HFA) 90 mcg/act inhaler Inhale 2 puffs every 6 (six) hours as needed for wheezing or shortness of breath 18 g 0    benzonatate (TESSALON PERLES) 100 mg capsule Take 1 capsule (100 mg total) by mouth 3 (three) times a day as needed for cough (Patient not taking: Reported on 4/19/2021) 20 capsule 0     No current facility-administered medications for this visit  Allergies   Allergen Reactions    Penicillins        Review of Systems   Constitutional: Positive for fatigue  Negative for chills and fever  HENT: Negative for congestion, ear pain, sinus pain, sore throat and trouble swallowing  Eyes: Negative for pain, discharge and redness  Respiratory: Positive for shortness of breath  Negative for cough, chest tightness and wheezing  Cardiovascular: Negative for chest pain, palpitations and leg swelling  Gastrointestinal: Negative for abdominal pain, diarrhea, nausea and vomiting  Musculoskeletal: Negative for arthralgias, joint swelling and myalgias  Skin: Negative for rash  Neurological: Negative for dizziness, weakness, numbness and headaches  All other systems reviewed and are negative  Objective:    Vitals:    04/21/21 1133   Weight: 101 kg (222 lb)   Height: 5' 6 5" (1 689 m)       Physical Exam  Constitutional:       General: He is not in acute distress    Neurological:      Mental Status: He is alert  VIRTUAL VISIT DISCLAIMER    Benedict Newman acknowledges that he has consented to an online visit or consultation  He understands that the online visit is based solely on information provided by him, and that, in the absence of a face-to-face physical evaluation by the physician, the diagnosis he receives is both limited and provisional in terms of accuracy and completeness  This is not intended to replace a full medical face-to-face evaluation by the physician  Benedict Newman understands and accepts these terms

## 2021-04-21 NOTE — LETTER
April 21, 2021     Patient: Nimo June   YOB: 1990   Date of Visit: 4/21/2021       To Whom it May Concern:    Nimo June is under my professional care  He was evaluated on 4/21/2021  He will be re-evaluated on 04/26/2021  If you have any questions or concerns, please don't hesitate to call           Sincerely,          Jacey Zhao PA-C        CC: No Recipients

## 2021-04-26 ENCOUNTER — TELEMEDICINE (OUTPATIENT)
Dept: FAMILY MEDICINE CLINIC | Facility: CLINIC | Age: 31
End: 2021-04-26
Payer: COMMERCIAL

## 2021-04-26 DIAGNOSIS — U07.1 COVID-19: Primary | ICD-10-CM

## 2021-04-26 PROCEDURE — 99212 OFFICE O/P EST SF 10 MIN: CPT | Performed by: PHYSICIAN ASSISTANT

## 2021-04-26 PROCEDURE — 1036F TOBACCO NON-USER: CPT | Performed by: PHYSICIAN ASSISTANT

## 2021-04-26 NOTE — LETTER
April 26, 2021     Patient: Jatin Yoder   YOB: 1990   Date of Visit: 4/26/2021       To Whom it May Concern:    Jtain Yoder is under my professional care  He was re-evaluated on 4/26/2021  He may return to work on 04/27/2021  If you develop any change in symptoms please do not hesitate to call for re-evaluation       If you have any questions or concerns, please don't hesitate to call           Sincerely,          Rivas Zhao PA-C        CC: No Recipients

## 2021-04-27 NOTE — PROGRESS NOTES
COVID-19 Outpatient Progress Note    Assessment/Plan:    Problem List Items Addressed This Visit     None         Disposition:     I recommended continued isolation until at least 24 hours have passed since recovery defined as resolution of fever without the use of fever-reducing medications AND improvement in COVID symptoms AND 10 days have passed since onset of symptoms (or 10 days have passed since date of first positive viral diagnostic test for asymptomatic patients)  Patient with much improvement  Not using his inhaler  He is clear to return to work  I have spent 5 minutes directly with the patient  Greater than 50% of this time was spent in counseling/coordination of care regarding: instructions for management  Encounter provider Desi Ndiaye PA-C    Provider located at [address] [phone]    Recent Visits  Date Type Provider Dept   04/21/21 Telemedicine Latrice Zhao PA-C [facility]   Showing recent visits within past 7 days and meeting all other requirements     Future Appointments  No visits were found meeting these conditions  Showing future appointments within next 150 days and meeting all other requirements        Patient agrees to participate in a virtual check in via telephone or video visit instead of presenting to the office to address urgent/immediate medical needs  Patient is aware this is a billable service  After connecting through Telephone, the patient was identified by name and date of birth  Shasta Moreira was informed that this was a telemedicine visit and that the exam was being conducted confidentially over secure lines  My office door was closed  Shasta Moreira acknowledged consent and understanding of privacy and security of the telemedicine visit   I informed the patient that I have reviewed his record in Epic and presented the opportunity for him to ask any questions regarding the visit today  The patient agreed to participate  It was my intent to perform this visit via video technology but the patient was not able to do a video connection so the visit was completed via audio telephone only  Subjective:   James Hernandez is a 35 y o  male who has been screened for COVID-19  Symptom change since last report: resolving  Patient's symptoms include shortness of breath (occasional)  Robi Rajan has been staying home and has isolated themselves in his home  He is taking care to not share personal items and is cleaning all surfaces that are touched often, like counters, tabletops, and doorknobs using household cleaning sprays or wipes  He is wearing a mask when he leaves his room  Lab Results   Component Value Date    SARSCOV2 Positive (A) 04/04/2021    SARSCOV2 Not Detected 11/03/2020     Past Medical History:   Diagnosis Date    No pertinent past medical history      Past Surgical History:   Procedure Laterality Date    NO PAST SURGERIES       Current Outpatient Medications   Medication Sig Dispense Refill    albuterol (PROVENTIL HFA,VENTOLIN HFA) 90 mcg/act inhaler Inhale 2 puffs every 6 (six) hours as needed for wheezing or shortness of breath 18 g 0    benzonatate (TESSALON PERLES) 100 mg capsule Take 1 capsule (100 mg total) by mouth 3 (three) times a day as needed for cough (Patient not taking: Reported on 4/19/2021) 20 capsule 0     No current facility-administered medications for this visit  Allergies   Allergen Reactions    Penicillins        Review of Systems   Respiratory: Positive for shortness of breath (occasional)  All other systems reviewed and are negative  Objective: There were no vitals filed for this visit  Physical Exam  Constitutional:       General: He is not in acute distress  Neurological:      Mental Status: He is alert         VIRTUAL VISIT DISCLAIMER    James Hernandez acknowledges that he has consented to an online visit or consultation  He understands that the online visit is based solely on information provided by him, and that, in the absence of a face-to-face physical evaluation by the physician, the diagnosis he receives is both limited and provisional in terms of accuracy and completeness  This is not intended to replace a full medical face-to-face evaluation by the physician  Sean Morales understands and accepts these terms

## 2021-11-11 PROBLEM — Z86.16 HISTORY OF COVID-19: Status: ACTIVE | Noted: 2021-04-05

## 2022-04-27 ENCOUNTER — OFFICE VISIT (OUTPATIENT)
Dept: FAMILY MEDICINE CLINIC | Facility: CLINIC | Age: 32
End: 2022-04-27

## 2022-04-27 VITALS
WEIGHT: 212 LBS | OXYGEN SATURATION: 98 % | BODY MASS INDEX: 33.27 KG/M2 | HEART RATE: 55 BPM | HEIGHT: 67 IN | DIASTOLIC BLOOD PRESSURE: 76 MMHG | SYSTOLIC BLOOD PRESSURE: 120 MMHG

## 2022-04-27 DIAGNOSIS — G89.29 CHRONIC PAIN OF RIGHT ANKLE: ICD-10-CM

## 2022-04-27 DIAGNOSIS — Z11.59 ENCOUNTER FOR HEPATITIS C SCREENING TEST FOR LOW RISK PATIENT: ICD-10-CM

## 2022-04-27 DIAGNOSIS — Z00.00 HEALTHCARE MAINTENANCE: Primary | ICD-10-CM

## 2022-04-27 DIAGNOSIS — Z13.1 SCREENING FOR DIABETES MELLITUS: ICD-10-CM

## 2022-04-27 DIAGNOSIS — M25.571 CHRONIC PAIN OF RIGHT ANKLE: ICD-10-CM

## 2022-04-27 DIAGNOSIS — Z13.220 SCREENING, LIPID: ICD-10-CM

## 2022-04-27 DIAGNOSIS — M25.471 RIGHT ANKLE SWELLING: ICD-10-CM

## 2022-04-27 DIAGNOSIS — Z11.4 SCREENING FOR HIV (HUMAN IMMUNODEFICIENCY VIRUS): ICD-10-CM

## 2022-04-27 PROCEDURE — 1036F TOBACCO NON-USER: CPT | Performed by: FAMILY MEDICINE

## 2022-04-27 PROCEDURE — 3008F BODY MASS INDEX DOCD: CPT | Performed by: FAMILY MEDICINE

## 2022-04-27 PROCEDURE — 3725F SCREEN DEPRESSION PERFORMED: CPT | Performed by: FAMILY MEDICINE

## 2022-04-27 PROCEDURE — 99395 PREV VISIT EST AGE 18-39: CPT | Performed by: FAMILY MEDICINE

## 2022-04-27 NOTE — PROGRESS NOTES
Frutoso Ice 1990 male MRN: [id number]      ASSESSMENT/PLAN  Problem List Items Addressed This Visit     None      Visit Diagnoses     Healthcare maintenance    -  Primary    Right ankle swelling        Relevant Orders    XR ankle 3+ vw right    Ambulatory Referral to Podiatry    Chronic pain of right ankle        Relevant Orders    XR ankle 3+ vw right    Ambulatory Referral to Podiatry    Screening, lipid        Relevant Orders    Lipid panel    Screening for diabetes mellitus        Relevant Orders    Comprehensive metabolic panel    Encounter for hepatitis C screening test for low risk patient        Relevant Orders    Hepatitis C antibody    Screening for HIV (human immunodeficiency virus)        Relevant Orders    HIV 1/2 Antigen/Antibody (4th Generation) w Reflex [facility]        Will XR ankle to eval for bony abnormalities and refer to Podiatry to discuss proper footwear     BP WNL   CMP + Lipids to screen for HLD, DM   HIV, Hep C screening ordered, pt agreeable   Vaccinations: TDap UTD, Flu deferred, COVID deferred  Encouraged regular physical activity, varied diet, and regular dental/eye exams     No future appointments  SUBJECTIVE  CC: Establish Care (physical )      HPI:  Sindhu Tompkins is a 28 y o  male who presents with his fiance for annual physical  History reviewed and updated as below  Review of Systems   Constitutional: Negative for unexpected weight change  Fever: purposely lost 20 pounds since 01/2022  HENT: Negative for congestion, ear pain, rhinorrhea and sore throat  Seasonal allergies in AM   Eyes: Negative for visual disturbance  Respiratory: Negative for cough and shortness of breath  Cardiovascular: Negative for chest pain, palpitations and leg swelling  Gastrointestinal: Negative for abdominal pain, constipation and diarrhea  Endocrine: Negative for polyuria  Genitourinary: Negative for dysuria     Musculoskeletal: Positive for arthralgias and joint swelling  R ankle -- some discomfort with ROM, intermittent swelling   Neurological: Negative for dizziness and headaches  Psychiatric/Behavioral: Negative for sleep disturbance  Historical Information   The patient history was reviewed and updated as follows:    Past Medical History:   Diagnosis Date    No pertinent past medical history      Past Surgical History:   Procedure Laterality Date    NO PAST SURGERIES       Family History   Problem Relation Age of Onset    No Known Problems Mother     Hypertension Father     Diabetes Maternal Grandmother     Diabetes Paternal Grandmother       Social History   Social History     Substance and Sexual Activity   Alcohol Use Yes     Social History     Substance and Sexual Activity   Drug Use Never     Social History     Tobacco Use   Smoking Status Former Smoker   Smokeless Tobacco Never Used   Tobacco Comment    Quit 11/2018; 1/2 ppd for 10 years        Medications:   No current outpatient medications on file  Allergies   Allergen Reactions    Penicillins        OBJECTIVE    Vitals:   Vitals:    04/27/22 0823   BP: 120/76   Pulse: 55   SpO2: 98%   Weight: 96 2 kg (212 lb)   Height: 5' 7" (1 702 m)           Physical Exam  Vitals and nursing note reviewed  Constitutional:       General: He is not in acute distress  Appearance: Normal appearance  HENT:      Head: Normocephalic and atraumatic  Right Ear: Tympanic membrane, ear canal and external ear normal       Left Ear: Tympanic membrane, ear canal and external ear normal       Nose: Nose normal       Mouth/Throat:      Mouth: Mucous membranes are moist       Pharynx: No oropharyngeal exudate or posterior oropharyngeal erythema  Eyes:      Conjunctiva/sclera: Conjunctivae normal    Cardiovascular:      Rate and Rhythm: Normal rate and regular rhythm  Pulmonary:      Effort: Pulmonary effort is normal  No respiratory distress  Breath sounds: Normal breath sounds     Abdominal: General: Bowel sounds are normal  There is no distension  Palpations: Abdomen is soft  Tenderness: There is no abdominal tenderness  Musculoskeletal:      Right lower leg: No edema  Left lower leg: No edema  Lymphadenopathy:      Cervical: No cervical adenopathy  Skin:     General: Skin is warm and dry  Neurological:      General: No focal deficit present  Mental Status: He is alert     Psychiatric:         Mood and Affect: Mood normal                     Lani Body DO  [facility]   4/27/2022  8:38 AM

## 2022-05-05 NOTE — PROGRESS NOTES
COVID-19 Outpatient Progress Note    Assessment/Plan:    Problem List Items Addressed This Visit        Other    COVID-19 - Primary         Disposition:     I recommended continued isolation until at least 24 hours have passed since recovery defined as resolution of fever without the use of fever-reducing medications AND improvement in COVID symptoms AND 10 days have passed since onset of symptoms (or 10 days have passed since date of first positive viral diagnostic test for asymptomatic patients)  71-year-old male presents for COVID follow-up  Patient continues to have shortness of breath  States that he was using his inhaler routinely last week and has decreased this week  He states he is using it more as needed  He does continue to complain of breath with exertion  He has concerns as he works for [employer] and would like to return however he is a little hesitant  Patient denies fever  Denies CP  I have spent 5 minutes directly with the patient  Greater than 50% of this time was spent in counseling/coordination of care regarding: instructions for management  Will re-evaluate in 2 days on return to work    Encounter provider [address] [place] [address], [place]    Provider located at [place] [address]  [address] [place] [id number]    Recent Visits  Date Type Provider Dept   04/14/21 JANIE/ [address], [place] [address]   04/12/21 Telemedicine DO Onesimo Rodríguez   Showing recent visits within past 7 days and meeting all other requirements     Today's Visits  Date Type Provider Dept   04/19/21 Telemedicine Shaila Zhao PA-C [address] today's visits and meeting all other requirements     Future Appointments  No visits were found meeting these conditions     Showing future appointments within next 150 days and meeting all other requirements        Patient agrees to participate in a virtual check in via telephone or video visit instead of presenting to the office to address urgent/immediate medical needs  Patient is aware this is a billable service  After connecting through Telephone, the patient was identified by name and date of birth  Isael Saunders was informed that this was a telemedicine visit and that the exam was being conducted confidentially over secure lines  My office door was closed  Isael Saunders acknowledged consent and understanding of privacy and security of the telemedicine visit  I informed the patient that I have reviewed his record in Epic and presented the opportunity for him to ask any questions regarding the visit today  The patient agreed to participate  It was my intent to perform this visit via video technology but the patient was not able to do a video connection so the visit was completed via audio telephone only  Subjective:   Isael Saunders is a 35 y o  male who has been screened for COVID-19  Patient's symptoms include shortness of breath  Patient denies fever, chills, fatigue, congestion, sore throat, cough, chest tightness, abdominal pain, nausea, vomiting, diarrhea, myalgias and headaches  Lab Results   Component Value Date    SARSCOV2 Positive (A) 04/04/2021    SARSCOV2 Not Detected 11/03/2020     Past Medical History:   Diagnosis Date    No pertinent past medical history      Past Surgical History:   Procedure Laterality Date    NO PAST SURGERIES       Current Outpatient Medications   Medication Sig Dispense Refill    albuterol (PROVENTIL HFA,VENTOLIN HFA) 90 mcg/act inhaler Inhale 2 puffs every 6 (six) hours as needed for wheezing or shortness of breath 18 g 0    benzonatate (TESSALON PERLES) 100 mg capsule Take 1 capsule (100 mg total) by mouth 3 (three) times a day as needed for cough (Patient not taking: Reported on 4/19/2021) 20 capsule 0     No current facility-administered medications for this visit        Allergies   Allergen Reactions    Penicillins        Review of Systems   Constitutional: Negative for chills, fatigue and fever  HENT: Negative for congestion, ear pain, sinus pain, sore throat and trouble swallowing  Eyes: Negative for pain, discharge and redness  Respiratory: Positive for shortness of breath  Negative for cough, chest tightness and wheezing  Cardiovascular: Negative for chest pain, palpitations and leg swelling  Gastrointestinal: Negative for abdominal pain, diarrhea, nausea and vomiting  Musculoskeletal: Negative for arthralgias, joint swelling and myalgias  Skin: Negative for rash  Neurological: Negative for dizziness, weakness, numbness and headaches  Objective:    Vitals:    04/19/21 1132   Weight: 101 kg (222 lb)   Height: 5' 6 5" (1 689 m)       Physical Exam  Constitutional:       General: He is not in acute distress  Neurological:      Mental Status: He is alert  VIRTUAL VISIT DISCLAIMER    Maryan Pringle acknowledges that he has consented to an online visit or consultation  He understands that the online visit is based solely on information provided by him, and that, in the absence of a face-to-face physical evaluation by the physician, the diagnosis he receives is both limited and provisional in terms of accuracy and completeness  This is not intended to replace a full medical face-to-face evaluation by the physician  Maryan Pringle understands and accepts these terms  Medical Assessment Completed on: 05-May-2022 02:17

## 2023-03-31 ENCOUNTER — TELEPHONE (OUTPATIENT)
Dept: FAMILY MEDICINE CLINIC | Facility: CLINIC | Age: 33
End: 2023-03-31

## 2023-03-31 NOTE — TELEPHONE ENCOUNTER
Patients wife called and said that Chaz Beasley forgot to ask you you if there were any tests that can be ordered to find out why they are struggling to get pregnant  He has not yet gone for the blood work you ordered and was unsure if there was anything else you can order for him or point him in the right direction  They have been trying for a little over a year, she has been tested by her doctor but will be coming to see you soon as well

## 2023-03-31 NOTE — TELEPHONE ENCOUNTER
Would recommend discussing with pt's OBGYN -- generally they will have both parents evaluated by a fertility specialist

## 2023-04-28 ENCOUNTER — OFFICE VISIT (OUTPATIENT)
Dept: FAMILY MEDICINE CLINIC | Facility: CLINIC | Age: 33
End: 2023-04-28

## 2023-04-28 VITALS
HEART RATE: 68 BPM | SYSTOLIC BLOOD PRESSURE: 112 MMHG | WEIGHT: 212.8 LBS | DIASTOLIC BLOOD PRESSURE: 74 MMHG | HEIGHT: 67 IN | TEMPERATURE: 97.8 F | OXYGEN SATURATION: 98 % | BODY MASS INDEX: 33.4 KG/M2

## 2023-04-28 DIAGNOSIS — Z13.220 SCREENING, LIPID: ICD-10-CM

## 2023-04-28 DIAGNOSIS — Z91.89 AT RISK FOR FERTILITY PROBLEMS: ICD-10-CM

## 2023-04-28 DIAGNOSIS — Z13.1 SCREENING FOR DIABETES MELLITUS: ICD-10-CM

## 2023-04-28 DIAGNOSIS — Z00.00 ANNUAL PHYSICAL EXAM: Primary | ICD-10-CM

## 2023-04-28 DIAGNOSIS — Z11.4 SCREENING FOR HIV (HUMAN IMMUNODEFICIENCY VIRUS): ICD-10-CM

## 2023-04-28 DIAGNOSIS — Z11.59 ENCOUNTER FOR HEPATITIS C SCREENING TEST FOR LOW RISK PATIENT: ICD-10-CM

## 2023-04-28 DIAGNOSIS — Q53.9 UNDESCENDED TESTICLE, UNSPECIFIED LATERALITY, UNSPECIFIED LOCATION: ICD-10-CM

## 2023-04-28 NOTE — PROGRESS NOTES
[facility]ULT [facility]NUAL [address] [facility]    NAME: Padmini Faith  AGE: 33 y o  SEX: male  : 1990     DATE: 2023     Assessment and Plan:     Problem List Items Addressed This Visit        Genitourinary    Undescended testicle     Patient was told that he has only one testicle unsure of history patient cant recall from childhood will check US to evalute his testicles with desire for fertility         Relevant Orders    US scrotum and testicles       Other    Annual physical exam - Primary    BMI 33 0-33 9,adult    Screening for diabetes mellitus    Relevant Orders    Comprehensive metabolic panel    HEMOGLOBIN A1C W/ EAG ESTIMATION    Screening, lipid    Relevant Orders    Lipid Panel with Direct LDL reflex    Screening for HIV (human immunodeficiency virus)    Relevant Orders    : HIV 1/2 AB/AG w Reflex [facility] for 2 yr old and above    At risk for fertility problems     Patient and his wife have been trying to conceive for the past two years unsuccessful at this point will refer to urology for evaluation          Relevant Orders    Ambulatory Referral to Urology    US scrotum and testicles    RESOLVED: Encounter for hepatitis C screening test for low risk patient    Relevant Orders    Hepatitis C antibody       Immunizations and preventive care screenings were discussed with patient today  Appropriate education was printed on patient's after visit summary  Counseling:  Injury prevention: discussed safety/seat belts, safety helmets, smoke detectors, carbon dioxide detectors, and smoking near bedding or upholstery  Exercise: the importance of regular exercise/physical activity was discussed  Recommend exercise 3-5 times per week for at least 30 minutes  BMI Counseling: Body mass index is 33 33 kg/m²   The BMI is above normal  Nutrition recommendations include decreasing portion sizes, encouraging healthy choices of fruits and vegetables, decreasing fast food intake, consuming healthier snacks, limiting drinks that contain sugar and moderation in carbohydrate intake  Exercise recommendations include moderate physical activity 150 minutes/week  No pharmacotherapy was ordered  Patient referred to PCP  Rationale for BMI follow-up plan is due to patient being overweight or obese  Depression Screening and Follow-up Plan: Patient was screened for depression during today's encounter  They screened negative with a PHQ-2 score of 0  Return in 1 year (on 4/28/2024)  Chief Complaint:     Chief Complaint   Patient presents with   • Annual Exam      History of Present Illness:     Adult Annual Physical   Patient here for a comprehensive physical exam  The patient reports no problems  Patient does report that his wife and him are trying to conceive for the past two years and have not been successful at this point and was told that he only had one testicle and he is unsure if he has only one or undescended and has not seen a specialist at this point     Diet and Physical Activity  Diet/Nutrition: well balanced diet  Exercise: moderate cardiovascular exercise  Depression Screening  PHQ-2/9 Depression Screening    Little interest or pleasure in doing things: 0 - not at all  Feeling down, depressed, or hopeless: 0 - not at all  PHQ-2 Score: 0  PHQ-2 Interpretation: Negative depression screen       General Health  Sleep: sleeps well and gets 7-8 hours of sleep on average  Hearing: normal - bilateral   Vision: goes for regular eye exams, most recent eye exam <1 year ago and wears glasses  Dental: regular dental visits   Health  History of STDs?: no      Review of Systems:     Review of Systems   Constitutional: Negative for activity change, appetite change, chills, diaphoresis, fatigue, fever and unexpected weight change  HENT: Negative for congestion, ear pain, hearing loss, postnasal drip, sinus pressure, sinus pain, sneezing and sore throat  Eyes: Negative for pain, redness and visual disturbance  Respiratory: Negative for cough and shortness of breath  Cardiovascular: Negative for chest pain and leg swelling  Gastrointestinal: Negative for abdominal pain, diarrhea, nausea and vomiting  Endocrine: Negative  Genitourinary: Negative  Musculoskeletal: Negative for arthralgias  Skin: Negative  Allergic/Immunologic: Negative  Neurological: Negative for dizziness and light-headedness  Hematological: Negative  Psychiatric/Behavioral: Negative for behavioral problems and dysphoric mood  Past Medical History:     Past Medical History:   Diagnosis Date   • No pertinent past medical history       Past Surgical History:     Past Surgical History:   Procedure Laterality Date   • NO PAST SURGERIES        Social History:     Social History     Socioeconomic History   • Marital status: /Civil Union     Spouse name: None   • Number of children: None   • Years of education: None   • Highest education level: None   Occupational History   • None   Tobacco Use   • Smoking status: Former   • Smokeless tobacco: Never   • Tobacco comments:     Quit 11/2018; 1/2 ppd for 10 years    Vaping Use   • Vaping Use: Never used   Substance and Sexual Activity   • Alcohol use:  Yes   • Drug use: Never   • Sexual activity: Yes     Partners: Female   Other Topics Concern   • None   Social History Narrative    Lives with girlfriend, family     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     Financial Resource Strain: Not on file   Food Insecurity: Not on file   Transportation Needs: Not on file   Physical Activity: Not on file   Stress: Not on file   Social Connections: Not on file   Intimate Partner Violence: Not on file   Housing Stability: Not on file      Family History:     Family History   Problem Relation Age of Onset   • No Known Problems Mother    • Hypertension Father    • Diabetes Maternal Grandmother    • Diabetes Paternal "Grandmother       Current Medications:     No current outpatient medications on file  No current facility-administered medications for this visit  Allergies: Allergies   Allergen Reactions   • Penicillins       Physical Exam:     /74 (BP Location: Left arm, Patient Position: Sitting)   Pulse 68   Temp 97 8 °F (36 6 °C) (Temporal)   Ht 5' 7\" (1 702 m)   Wt 96 5 kg (212 lb 12 8 oz)   SpO2 98%   BMI 33 33 kg/m²     Physical Exam  Vitals and nursing note reviewed  Constitutional:       General: He is not in acute distress  Appearance: He is well-developed  HENT:      Head: Normocephalic and atraumatic  Right Ear: Tympanic membrane normal       Left Ear: Tympanic membrane normal       Nose: Nose normal       Mouth/Throat:      Mouth: Mucous membranes are moist    Eyes:      Conjunctiva/sclera: Conjunctivae normal    Cardiovascular:      Rate and Rhythm: Normal rate and regular rhythm  Heart sounds: No murmur heard  Pulmonary:      Effort: Pulmonary effort is normal  No respiratory distress  Breath sounds: Normal breath sounds  Abdominal:      Palpations: Abdomen is soft  Tenderness: There is no abdominal tenderness  Musculoskeletal:         General: No swelling  Cervical back: Normal range of motion and neck supple  Skin:     General: Skin is warm and dry  Capillary Refill: Capillary refill takes less than 2 seconds  Neurological:      General: No focal deficit present  Mental Status: He is alert and oriented to person, place, and time     Psychiatric:         Mood and Affect: Mood normal           LAVERNE Avitia   [facility]  "

## 2023-04-28 NOTE — ASSESSMENT & PLAN NOTE
Patient and his wife have been trying to conceive for the past two years unsuccessful at this point will refer to urology for evaluation

## 2023-04-28 NOTE — ASSESSMENT & PLAN NOTE
Patient was told that he has only one testicle unsure of history patient cant recall from childhood will check US to evalute his testicles with desire for fertility

## 2023-06-03 ENCOUNTER — APPOINTMENT (OUTPATIENT)
Dept: RADIOLOGY | Facility: CLINIC | Age: 33
End: 2023-06-03
Payer: COMMERCIAL

## 2023-06-03 ENCOUNTER — APPOINTMENT (OUTPATIENT)
Dept: LAB | Facility: CLINIC | Age: 33
End: 2023-06-03
Payer: COMMERCIAL

## 2023-06-03 DIAGNOSIS — Z13.1 SCREENING FOR DIABETES MELLITUS: ICD-10-CM

## 2023-06-03 DIAGNOSIS — Z11.4 SCREENING FOR HIV (HUMAN IMMUNODEFICIENCY VIRUS): ICD-10-CM

## 2023-06-03 DIAGNOSIS — G89.29 CHRONIC PAIN OF RIGHT ANKLE: ICD-10-CM

## 2023-06-03 DIAGNOSIS — M25.471 RIGHT ANKLE SWELLING: ICD-10-CM

## 2023-06-03 DIAGNOSIS — Z11.59 ENCOUNTER FOR HEPATITIS C SCREENING TEST FOR LOW RISK PATIENT: ICD-10-CM

## 2023-06-03 DIAGNOSIS — Z13.220 SCREENING, LIPID: ICD-10-CM

## 2023-06-03 DIAGNOSIS — M25.571 CHRONIC PAIN OF RIGHT ANKLE: ICD-10-CM

## 2023-06-03 LAB
ALBUMIN SERPL BCP-MCNC: 4.1 G/DL (ref 3.5–5)
ALP SERPL-CCNC: 75 U/L (ref 46–116)
ALT SERPL W P-5'-P-CCNC: 55 U/L (ref 12–78)
ANION GAP SERPL CALCULATED.3IONS-SCNC: -1 MMOL/L (ref 4–13)
AST SERPL W P-5'-P-CCNC: 31 U/L (ref 5–45)
BILIRUB SERPL-MCNC: 0.87 MG/DL (ref 0.2–1)
BUN SERPL-MCNC: 18 MG/DL (ref 5–25)
CALCIUM SERPL-MCNC: 9.2 MG/DL (ref 8.3–10.1)
CHLORIDE SERPL-SCNC: 109 MMOL/L (ref 96–108)
CHOLEST SERPL-MCNC: 180 MG/DL
CO2 SERPL-SCNC: 29 MMOL/L (ref 21–32)
CREAT SERPL-MCNC: 0.98 MG/DL (ref 0.6–1.3)
EST. AVERAGE GLUCOSE BLD GHB EST-MCNC: 103 MG/DL
GFR SERPL CREATININE-BSD FRML MDRD: 101 ML/MIN/1.73SQ M
GLUCOSE P FAST SERPL-MCNC: 93 MG/DL (ref 65–99)
HBA1C MFR BLD: 5.2 %
HDLC SERPL-MCNC: 54 MG/DL
LDLC SERPL CALC-MCNC: 116 MG/DL (ref 0–100)
POTASSIUM SERPL-SCNC: 4.6 MMOL/L (ref 3.5–5.3)
PROT SERPL-MCNC: 7.9 G/DL (ref 6.4–8.4)
SODIUM SERPL-SCNC: 137 MMOL/L (ref 135–147)
TRIGL SERPL-MCNC: 48 MG/DL

## 2023-06-03 PROCEDURE — 73610 X-RAY EXAM OF ANKLE: CPT

## 2023-06-03 PROCEDURE — 86803 HEPATITIS C AB TEST: CPT

## 2023-06-03 PROCEDURE — 80061 LIPID PANEL: CPT

## 2023-06-03 PROCEDURE — 80053 COMPREHEN METABOLIC PANEL: CPT

## 2023-06-03 PROCEDURE — 36415 COLL VENOUS BLD VENIPUNCTURE: CPT

## 2023-06-03 PROCEDURE — 83036 HEMOGLOBIN GLYCOSYLATED A1C: CPT

## 2023-06-03 PROCEDURE — 87389 HIV-1 AG W/HIV-1&-2 AB AG IA: CPT

## 2023-06-04 LAB
HCV AB SER QL: NORMAL
HIV 1+2 AB+HIV1 P24 AG SERPL QL IA: NORMAL
HIV 2 AB SERPL QL IA: NORMAL
HIV1 AB SERPL QL IA: NORMAL
HIV1 P24 AG SERPL QL IA: NORMAL

## 2023-06-08 DIAGNOSIS — M25.571 CHRONIC PAIN OF RIGHT ANKLE: Primary | ICD-10-CM

## 2023-06-08 DIAGNOSIS — Q66.89 TARSAL COALITION: ICD-10-CM

## 2023-06-08 DIAGNOSIS — G89.29 CHRONIC PAIN OF RIGHT ANKLE: Primary | ICD-10-CM

## 2023-06-08 DIAGNOSIS — M25.471 RIGHT ANKLE SWELLING: ICD-10-CM

## 2023-06-08 PROBLEM — Z13.220 SCREENING, LIPID: Status: RESOLVED | Noted: 2023-04-28 | Resolved: 2023-06-08

## 2023-06-08 PROBLEM — Z00.00 ANNUAL PHYSICAL EXAM: Status: RESOLVED | Noted: 2023-04-28 | Resolved: 2023-06-08

## 2023-06-08 PROBLEM — Z11.4 SCREENING FOR HIV (HUMAN IMMUNODEFICIENCY VIRUS): Status: RESOLVED | Noted: 2023-04-28 | Resolved: 2023-06-08

## 2023-06-08 PROBLEM — Z13.1 SCREENING FOR DIABETES MELLITUS: Status: RESOLVED | Noted: 2023-04-28 | Resolved: 2023-06-08

## 2023-06-24 ENCOUNTER — HOSPITAL ENCOUNTER (OUTPATIENT)
Dept: ULTRASOUND IMAGING | Facility: HOSPITAL | Age: 33
Discharge: HOME | End: 2023-06-24
Payer: COMMERCIAL

## 2023-06-24 DIAGNOSIS — Z91.89 AT RISK FOR FERTILITY PROBLEMS: ICD-10-CM

## 2023-06-24 DIAGNOSIS — Q53.9 UNDESCENDED TESTICLE, UNSPECIFIED LATERALITY, UNSPECIFIED LOCATION: ICD-10-CM

## 2023-06-24 PROCEDURE — 76870 US EXAM SCROTUM: CPT

## 2023-06-26 ENCOUNTER — TELEPHONE (OUTPATIENT)
Dept: UROLOGY | Facility: MEDICAL CENTER | Age: 33
End: 2023-06-26

## 2023-06-26 NOTE — TELEPHONE ENCOUNTER
Please Triage -   New Patient- [facility]    What is the reason for the patients appointment? Patient called stating he was referred to see Urology for fertility issues  He is scheduled to see Dr Omkar Bay 0816/23 at 345pm       Imaging/Lab Results:      Do we accept the patient's insurance or is the patient Self-Pay? Provider & Plan: [employer]   Member ID#: Has the patient had any previous urologist(s)?no        Have patient records been requested?in epic        Has the patient had any outside testing done?   In epic     Does the patient have a personal history of cancer?no       Patient can be reached at :

## 2023-07-02 DIAGNOSIS — Z91.89 AT RISK FOR FERTILITY PROBLEMS: ICD-10-CM

## 2023-07-02 DIAGNOSIS — Q53.9 UNDESCENDED TESTICLE, UNSPECIFIED LATERALITY, UNSPECIFIED LOCATION: Primary | ICD-10-CM

## 2023-07-27 DIAGNOSIS — Q53.9 UNDESCENDED TESTICLE, UNSPECIFIED LATERALITY, UNSPECIFIED LOCATION: Primary | ICD-10-CM

## 2023-08-05 ENCOUNTER — HOSPITAL ENCOUNTER (OUTPATIENT)
Dept: CT IMAGING | Facility: HOSPITAL | Age: 33
Discharge: HOME | End: 2023-08-05
Payer: COMMERCIAL

## 2023-08-05 DIAGNOSIS — Q53.9 UNDESCENDED TESTICLE, UNSPECIFIED LATERALITY, UNSPECIFIED LOCATION: ICD-10-CM

## 2023-08-05 PROCEDURE — 72193 CT PELVIS W/DYE: CPT

## 2023-08-05 PROCEDURE — G1004 CDSM NDSC: HCPCS

## 2023-08-05 RX ADMIN — IOHEXOL 100 ML: 350 INJECTION, SOLUTION INTRAVENOUS at 11:13

## 2023-08-14 ENCOUNTER — TELEPHONE (OUTPATIENT)
Dept: FAMILY MEDICINE CLINIC | Facility: CLINIC | Age: 33
End: 2023-08-14

## 2023-08-14 DIAGNOSIS — Q53.9 UNDESCENDED TESTICLE, UNSPECIFIED LATERALITY, UNSPECIFIED LOCATION: Primary | ICD-10-CM

## 2023-08-14 DIAGNOSIS — R93.5 ABNORMAL CT SCAN, PELVIS: ICD-10-CM

## 2023-08-14 NOTE — TELEPHONE ENCOUNTER
Pt responding to your mychart msg: He would like to have the MRI ordered so he can move forward and have the testing done as soon as possible.

## 2023-08-14 NOTE — TELEPHONE ENCOUNTER
Ordered -- please make sure pt is aware that I ordered both abdomen and pelvis (per Radiology recommendation) -- he should be able to schedule them both at the same time though

## 2023-09-09 ENCOUNTER — HOSPITAL ENCOUNTER (OUTPATIENT)
Dept: MRI IMAGING | Facility: HOSPITAL | Age: 33
Discharge: HOME | End: 2023-09-09
Payer: COMMERCIAL

## 2023-09-09 DIAGNOSIS — Q53.9 UNDESCENDED TESTICLE, UNSPECIFIED LATERALITY, UNSPECIFIED LOCATION: ICD-10-CM

## 2023-09-09 DIAGNOSIS — R93.5 ABNORMAL CT SCAN, PELVIS: ICD-10-CM

## 2023-09-09 PROCEDURE — A9585 GADOBUTROL INJECTION: HCPCS | Performed by: FAMILY MEDICINE

## 2023-09-09 PROCEDURE — G1004 CDSM NDSC: HCPCS

## 2023-09-09 PROCEDURE — 72197 MRI PELVIS W/O & W/DYE: CPT

## 2023-09-09 PROCEDURE — 74183 MRI ABD W/O CNTR FLWD CNTR: CPT

## 2023-09-09 RX ORDER — GADOBUTROL 604.72 MG/ML
9 INJECTION INTRAVENOUS
Status: COMPLETED | OUTPATIENT
Start: 2023-09-09 | End: 2023-09-09

## 2023-09-09 RX ADMIN — GADOBUTROL 9 ML: 604.72 INJECTION INTRAVENOUS at 15:20

## 2023-09-18 DIAGNOSIS — Q53.112 UNILATERAL INGUINAL TESTIS: Primary | ICD-10-CM

## 2024-09-19 ENCOUNTER — OFFICE VISIT (OUTPATIENT)
Dept: URGENT CARE | Facility: CLINIC | Age: 34
End: 2024-09-19
Payer: COMMERCIAL

## 2024-09-19 VITALS
DIASTOLIC BLOOD PRESSURE: 83 MMHG | RESPIRATION RATE: 18 BRPM | OXYGEN SATURATION: 98 % | TEMPERATURE: 98.1 F | SYSTOLIC BLOOD PRESSURE: 142 MMHG | HEART RATE: 72 BPM

## 2024-09-19 DIAGNOSIS — J30.2 SEASONAL ALLERGIES: Primary | ICD-10-CM

## 2024-09-19 PROCEDURE — 99213 OFFICE O/P EST LOW 20 MIN: CPT | Performed by: PHYSICAL MEDICINE & REHABILITATION

## 2024-09-19 RX ORDER — FLUTICASONE PROPIONATE 50 MCG
1 SPRAY, SUSPENSION (ML) NASAL DAILY
Qty: 15.8 ML | Refills: 0 | Status: SHIPPED | OUTPATIENT
Start: 2024-09-19

## 2024-09-19 NOTE — LETTER
September 19, 2024     Patient: Theodore Joya   YOB: 1990   Date of Visit: 9/19/2024       To Whom it May Concern:    Theodore Joya was seen in my clinic on 9/19/2024. He may return to work on 9/20/24 .    If you have any questions or concerns, please don't hesitate to call.         Sincerely,          Rachel Lomeli PA-C        CC: No Recipients   Yes

## 2024-09-19 NOTE — PATIENT INSTRUCTIONS
Vitamin D3 2000 IU daily.  Vitamin C 1000mg twice per day.  Multivitamin daily.  Some studies suggest that Zinc 12.5-15mg every 2 hours while awake x 5 days may shorten the duration cold symptoms by 1-2 days.   Fluids and rest.  Nasal saline spray; Afrin if severe congestion (do not use for more than 3 days)  Over the counter cough/cold medications as needed.   Flonase nasal spray.  Tylenol/Ibuprofen for pain/fever.  Salt water gargles and/or chloraseptic spray.  Warm tea with honey.  Warm compresses over sinuses.  Nasal rinses with distilled water.    Common symptoms and over the counter treatments:  For congestion: antihistamines for decongestants or allergy relief include: Benadryl, brompheniramine (Dimetapp),  doxylamine  Decongestant: Pseudoephedrine   Fever control: Acetaminophen or Ibuprofen   Cough suppressant- when your cough is dry : Dextromethorphan (Delysm, Robitussin)  Cough expectorant- when your cough is productive/wet: guaifenesin  (Mucinex)

## 2024-09-19 NOTE — PROGRESS NOTES
[facility]        NAME: Theodore Joya is a 34 y.o. male  : 1990    MRN: [id number]  DATE: 2024  TIME: 11:12 AM    Assessment and Plan   Seasonal allergies [J30.2]  1. Seasonal allergies  fluticasone (FLONASE) 50 mcg/act nasal spray            Patient Instructions     Vitamin D3 2000 IU daily.  Vitamin C 1000mg twice per day.  Multivitamin daily.  Some studies suggest that Zinc 12.5-15mg every 2 hours while awake x 5 days may shorten the duration cold symptoms by 1-2 days.   Fluids and rest.  Nasal saline spray; Afrin if severe congestion (do not use for more than 3 days)  Over the counter cough/cold medications as needed.   Flonase nasal spray.  Tylenol/Ibuprofen for pain/fever.  Salt water gargles and/or chloraseptic spray.  Warm tea with honey.  Warm compresses over sinuses.  Nasal rinses with distilled water.    Common symptoms and over the counter treatments:  For congestion: antihistamines for decongestants or allergy relief include: Benadryl, brompheniramine (Dimetapp),  doxylamine  Decongestant: Pseudoephedrine   Fever control: Acetaminophen or Ibuprofen   Cough suppressant- when your cough is dry : Dextromethorphan (Delysm, Robitussin)  Cough expectorant- when your cough is productive/wet: guaifenesin  (Mucinex)    Follow up with PCP in 3-5 days.  Proceed to  ER if symptoms worsen.    If tests are performed, our office will contact you with results only if changes need to made to the care plan discussed with you at the visit. You can review your full results on [facility].    Chief Complaint     Chief Complaint   Patient presents with    Nasal Congestion     C/o nasal congestion, head pressure cough when laying flat.   States approx 3 days. Taking allergy medication, and nyquill with mild relief         History of Present Illness       Patient presenting with nasal congestion, sinus pressure, cough with laying flat. His symptoms started on 24. He has taken OTC  allergy medication and nyquil with mild relief.        Review of Systems   Review of Systems   Constitutional: Negative.    HENT:  Positive for congestion, postnasal drip and sinus pressure.    Respiratory:  Positive for cough.    Cardiovascular: Negative.    Gastrointestinal: Negative.    Musculoskeletal: Negative.          Current Medications       Current Outpatient Medications:     fluticasone (FLONASE) 50 mcg/act nasal spray, 1 spray into each nostril daily, Disp: 15.8 mL, Rfl: 0    Current Allergies     Allergies as of 09/19/2024 - Reviewed 09/19/2024   Allergen Reaction Noted    Penicillins  11/03/2020            The following portions of the patient's history were reviewed and updated as appropriate: allergies, current medications, past family history, past medical history, past social history, past surgical history and problem list.     Past Medical History:   Diagnosis Date    No pertinent past medical history        Past Surgical History:   Procedure Laterality Date    NO PAST SURGERIES         Family History   Problem Relation Age of Onset    No Known Problems Mother     Hypertension Father     Diabetes Maternal Grandmother     Diabetes Paternal Grandmother          Medications have been verified.        Objective   /83   Pulse 72   Temp 98.1 °F (36.7 °C)   Resp 18   SpO2 98%        Physical Exam     Physical Exam  Constitutional:       General: He is not in acute distress.     Appearance: He is not ill-appearing.   HENT:      Right Ear: Tympanic membrane normal.      Left Ear: Tympanic membrane normal.      Nose: Congestion present. No rhinorrhea.      Mouth/Throat:      Mouth: Mucous membranes are moist.      Pharynx: Oropharynx is clear. No oropharyngeal exudate or posterior oropharyngeal erythema.   Eyes:      Conjunctiva/sclera: Conjunctivae normal.   Cardiovascular:      Rate and Rhythm: Normal rate and regular rhythm.      Heart sounds: Normal heart sounds.   Pulmonary:      Effort:  Pulmonary effort is normal. No respiratory distress.      Breath sounds: Normal breath sounds. No wheezing, rhonchi or rales.   Musculoskeletal:      Cervical back: Normal range of motion and neck supple.   Lymphadenopathy:      Cervical: No cervical adenopathy.   Skin:     General: Skin is warm.   Neurological:      Mental Status: He is alert.   Psychiatric:         Mood and Affect: Mood normal.         Behavior: Behavior normal.

## 2025-04-30 ENCOUNTER — TELEPHONE (OUTPATIENT)
Dept: FAMILY MEDICINE CLINIC | Facility: CLINIC | Age: 35
End: 2025-04-30

## 2025-05-22 ENCOUNTER — RA CDI HCC (OUTPATIENT)
Dept: OTHER | Facility: HOSPITAL | Age: 35
End: 2025-05-22

## 2025-05-23 ENCOUNTER — TELEPHONE (OUTPATIENT)
Age: 35
End: 2025-05-23

## 2025-05-23 DIAGNOSIS — Z31.69 INFERTILITY COUNSELING: Primary | ICD-10-CM

## 2025-05-23 NOTE — TELEPHONE ENCOUNTER
"Patient called stating he and his wife will be seeing a fertility specialist. He would like for PCP to place a referral for him. He stated that his wife has a referral for herself and he would like to have one also.         He did not specify a diagnosis, he stated it would just be a \"regular\" referral for fertility specialist. .     Patient would like referral faxed to: [phone]  "

## 2025-05-29 PROBLEM — Z86.16 HISTORY OF COVID-19: Status: RESOLVED | Noted: 2021-04-05 | Resolved: 2025-05-29

## 2025-05-29 NOTE — PROGRESS NOTES
"Name: Theodore Joya      : 1990      MRN: [id number]  Encounter Provider: Lani Boyd DO  Encounter Date: 2025   Encounter department: [facility]  :  Assessment & Plan  Healthcare maintenance  BP WNL   CMP/A1c + Lipids to screen for HLD, DM   Vaccinations: TDap UTD   Encouraged regular physical activity, varied diet, and regular dental/eye exams          Unilateral inguinal testis    Orders:  •  Ambulatory referral to Urology; Future    At risk for fertility problems    Orders:  •  Ambulatory referral to Urology; Future    Deviated septum    Orders:  •  Ambulatory Referral to Otolaryngology; Future    Screening for diabetes mellitus    Orders:  •  Comprehensive metabolic panel; Future  •  Hemoglobin A1C; Future    Screening, lipid    Orders:  •  Lipid panel; Future           History of Present Illness   HPI    Pt presents for annual physical     Would like referral to Urology to discuss fertility and to ENT to discuss deviated septum     Review of Systems   Constitutional:  Negative for unexpected weight change.   HENT:  Negative for congestion, ear pain, rhinorrhea and sore throat.         (+) allergies   Eyes:  Negative for visual disturbance.   Respiratory:  Negative for cough and shortness of breath.    Cardiovascular:  Negative for chest pain, palpitations and leg swelling.   Gastrointestinal:  Negative for abdominal pain, blood in stool, constipation and diarrhea.   Endocrine: Negative for polyuria.   Genitourinary:  Negative for dysuria and hematuria.   Musculoskeletal:  Positive for back pain (intermittent) and neck stiffness (here and there).        Does bi-weekly chiropractic care   Neurological:  Negative for dizziness and headaches.   Psychiatric/Behavioral:  Negative for sleep disturbance.        Objective   /78   Pulse 67   Temp 97.9 °F (36.6 °C)   Ht 5' 7\" (1.702 m)   Wt 95.7 kg (211 lb)   SpO2 98%   BMI 33.05 kg/m²      Physical Exam  Vitals and " nursing note reviewed.   Constitutional:       General: He is not in acute distress.     Appearance: Normal appearance. He is well-developed.   HENT:      Head: Normocephalic and atraumatic.      Right Ear: Tympanic membrane, ear canal and external ear normal.      Left Ear: Tympanic membrane, ear canal and external ear normal.      Nose: Nose normal. No rhinorrhea.      Mouth/Throat:      Mouth: Mucous membranes are moist.      Pharynx: No oropharyngeal exudate or posterior oropharyngeal erythema.     Eyes:      Conjunctiva/sclera: Conjunctivae normal.     Neck:      Thyroid: No thyromegaly.     Cardiovascular:      Rate and Rhythm: Normal rate and regular rhythm.   Pulmonary:      Effort: Pulmonary effort is normal. No respiratory distress.      Breath sounds: Normal breath sounds.   Abdominal:      General: Bowel sounds are normal. There is no distension.      Palpations: Abdomen is soft.      Tenderness: There is no abdominal tenderness.     Musculoskeletal:      Right lower leg: No edema.      Left lower leg: No edema.   Lymphadenopathy:      Cervical: No cervical adenopathy.     Skin:     General: Skin is warm and dry.     Neurological:      Mental Status: He is alert.      Comments: Grossly intact   Psychiatric:         Mood and Affect: Mood normal.

## 2025-05-30 ENCOUNTER — OFFICE VISIT (OUTPATIENT)
Dept: FAMILY MEDICINE CLINIC | Facility: CLINIC | Age: 35
End: 2025-05-30
Payer: COMMERCIAL

## 2025-05-30 VITALS
DIASTOLIC BLOOD PRESSURE: 78 MMHG | OXYGEN SATURATION: 98 % | WEIGHT: 211 LBS | BODY MASS INDEX: 33.12 KG/M2 | HEIGHT: 67 IN | SYSTOLIC BLOOD PRESSURE: 122 MMHG | HEART RATE: 67 BPM | TEMPERATURE: 97.9 F

## 2025-05-30 DIAGNOSIS — Z13.1 SCREENING FOR DIABETES MELLITUS: ICD-10-CM

## 2025-05-30 DIAGNOSIS — Z00.00 HEALTHCARE MAINTENANCE: Primary | ICD-10-CM

## 2025-05-30 DIAGNOSIS — Z91.89 AT RISK FOR FERTILITY PROBLEMS: ICD-10-CM

## 2025-05-30 DIAGNOSIS — J34.2 DEVIATED SEPTUM: ICD-10-CM

## 2025-05-30 DIAGNOSIS — Z13.220 SCREENING, LIPID: ICD-10-CM

## 2025-05-30 DIAGNOSIS — Q53.112 UNILATERAL INGUINAL TESTIS: ICD-10-CM

## 2025-05-30 PROCEDURE — 99395 PREV VISIT EST AGE 18-39: CPT | Performed by: FAMILY MEDICINE

## 2025-05-30 RX ORDER — ZINC GLUCONATE 50 MG
50 TABLET ORAL DAILY
COMMUNITY

## 2025-06-02 ENCOUNTER — TELEPHONE (OUTPATIENT)
Age: 35
End: 2025-06-02

## 2025-06-02 NOTE — TELEPHONE ENCOUNTER
New Patient      Insurance   Current Insurance?[employer]   Insurance E-verified? yes     History   Reason for appointment/active symptoms?  Unilateral inguinal testis, At risk for fertility problems     Has the patient had any previous Urologist(s)? no     Was the patient seen in the ED? no     Labs/Imaging(Including Out Of Network)? no     Records Requested? no  Records Visible in EPIC? yes     Personal history of cancer? no     Appointment   Office location preference:  [place]   ?   Appointment Details   Date:  8/15/25  Time:  8:20am  Location:  [place]  Provider:  Elizabeth  Does the appointment need further review? no

## 2025-06-26 ENCOUNTER — OCCMED (OUTPATIENT)
Dept: URGENT CARE | Age: 35
End: 2025-06-26
Payer: OTHER MISCELLANEOUS

## 2025-06-26 DIAGNOSIS — R21 RASH: Primary | ICD-10-CM

## 2025-06-26 PROCEDURE — G0382 LEV 3 HOSP TYPE B ED VISIT: HCPCS | Performed by: PHYSICIAN ASSISTANT

## 2025-06-26 PROCEDURE — 99283 EMERGENCY DEPT VISIT LOW MDM: CPT | Performed by: PHYSICIAN ASSISTANT

## 2025-07-17 NOTE — PROGRESS NOTES
Name: Theodore Joya      : 1990      MRN: [id number]  Encounter Provider: Sarah Schnitzler, PA-C  Encounter Date: 2025   Encounter department: [facility] [place]  :  Assessment & Plan  Inguinal undescended testis, unspecified laterality  - MRI pelvis from 23 - Absent left testes in the scrotum. Nodular 17 x 8 mm nodular soft tissue structure in the left inguinal canal is likely an atrophic undescended left testes. Normal right testes  - Exam today with right testicle palpable in scrotal sac without abnormalities. No left testicle palpated   - Obtain scrotal testicular US  - Recommend consultation with surgeon to discuss if left orchiectomy is warranted.      Orders:    US scrotum and testicles; Future    At risk for fertility problems  - Had recent semen analysis with only reported abnormality being some abnormal sperm morphology, good sperm count and motility. Continue follow up with fertility as planned. Him and his partner are likely pursuing IVF    Orders:    Ambulatory referral to Urology    US scrotum and testicles; Future        History of Present Illness   Theodore Joya is a 35 y.o. male who presents for evaluation of undescended testicle and fertility concerns. He has never seen a specialist regarding his undescended testicle and was never recommended to have surgery. Denies any right testicular pain or masses. No prior  surgeries. No family history of  malignancy. Him and his partner have been trying to conceive for 4 years. They have been following with fertility clinic and are likely pursuing IVF.     Review of Systems   Constitutional:  Negative for chills and fever.   Respiratory:  Negative for shortness of breath.    Cardiovascular:  Negative for chest pain.   Gastrointestinal:  Negative for abdominal pain.   Genitourinary:  Negative for difficulty urinating, dysuria, flank pain, frequency, hematuria, scrotal swelling, testicular pain and urgency.  "  Neurological:  Negative for dizziness.          Objective   There were no vitals taken for this visit.    Physical Exam  Constitutional:       Appearance: Normal appearance.   HENT:      Head: Normocephalic and atraumatic.      Right Ear: External ear normal.      Left Ear: External ear normal.      Nose: Nose normal.     Eyes:      General: No scleral icterus.     Conjunctiva/sclera: Conjunctivae normal.       Cardiovascular:      Pulses: Normal pulses.   Pulmonary:      Effort: Pulmonary effort is normal.   Genitourinary:     Comments: Only right testicle palpable within scrotum. Absent left testicle. No inguinal masses palpated. No intratesticular masses. No hydrocele. No palpable varicocele.     Musculoskeletal:         General: Normal range of motion.      Cervical back: Normal range of motion.     Skin:     General: Skin is warm and dry.     Neurological:      General: No focal deficit present.      Mental Status: He is alert and oriented to person, place, and time.     Psychiatric:         Mood and Affect: Mood normal.         Behavior: Behavior normal.         Thought Content: Thought content normal.         Judgment: Judgment normal.          Results   No results found for: \"PSA\"  Lab Results   Component Value Date    CALCIUM 9.2 06/03/2023    K 4.6 06/03/2023    CO2 29 06/03/2023     (H) 06/03/2023    BUN 18 06/03/2023    CREATININE 0.98 06/03/2023     No results found for: \"WBC\", \"HGB\", \"HCT\", \"MCV\", \"PLT\"    Office Urine Dip  No results found for this or any previous visit (from the past hour).        "

## 2025-07-17 NOTE — ASSESSMENT & PLAN NOTE
- Had recent semen analysis with only reported abnormality being some abnormal sperm morphology, good sperm count and motility. Continue follow up with fertility as planned. Him and his partner are likely pursuing IVF    Orders:    Ambulatory referral to Urology    US scrotum and testicles; Future

## 2025-07-18 ENCOUNTER — OFFICE VISIT (OUTPATIENT)
Dept: UROLOGY | Facility: CLINIC | Age: 35
End: 2025-07-18
Attending: FAMILY MEDICINE
Payer: COMMERCIAL

## 2025-07-18 VITALS
SYSTOLIC BLOOD PRESSURE: 128 MMHG | TEMPERATURE: 97.9 F | OXYGEN SATURATION: 98 % | HEIGHT: 67 IN | WEIGHT: 215 LBS | DIASTOLIC BLOOD PRESSURE: 80 MMHG | BODY MASS INDEX: 33.74 KG/M2 | HEART RATE: 57 BPM

## 2025-07-18 DIAGNOSIS — Z91.89 AT RISK FOR FERTILITY PROBLEMS: ICD-10-CM

## 2025-07-18 DIAGNOSIS — Q53.112 UNILATERAL INGUINAL TESTIS: ICD-10-CM

## 2025-07-18 DIAGNOSIS — Q53.9 INGUINAL UNDESCENDED TESTIS, UNSPECIFIED LATERALITY: Primary | ICD-10-CM

## 2025-07-18 PROCEDURE — 99203 OFFICE O/P NEW LOW 30 MIN: CPT | Performed by: PHYSICIAN ASSISTANT

## 2025-07-18 RX ORDER — MULTIVITAMIN
1 TABLET ORAL DAILY
COMMUNITY

## 2025-08-02 ENCOUNTER — HOSPITAL ENCOUNTER (OUTPATIENT)
Dept: ULTRASOUND IMAGING | Facility: HOSPITAL | Age: 35
Discharge: HOME | End: 2025-08-02
Payer: COMMERCIAL

## 2025-08-02 DIAGNOSIS — Z91.89 AT RISK FOR FERTILITY PROBLEMS: ICD-10-CM

## 2025-08-02 DIAGNOSIS — Q53.9 INGUINAL UNDESCENDED TESTIS, UNSPECIFIED LATERALITY: ICD-10-CM

## 2025-08-02 PROCEDURE — 76870 US EXAM SCROTUM: CPT
